# Patient Record
Sex: MALE | Race: WHITE | NOT HISPANIC OR LATINO | Employment: STUDENT | URBAN - METROPOLITAN AREA
[De-identification: names, ages, dates, MRNs, and addresses within clinical notes are randomized per-mention and may not be internally consistent; named-entity substitution may affect disease eponyms.]

---
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
## 2023-02-14 ENCOUNTER — TELEPHONE (OUTPATIENT)
Age: 18
End: 2023-02-14

## 2023-03-27 NOTE — TELEPHONE ENCOUNTER
03/27/23 2:09 PM     The office's request has been received, reviewed, and the patient chart updated  The PCP has successfully been removed with a patient attribution note  This message will now be completed      Thank you  Concepción Baron

## 2023-06-12 ENCOUNTER — OFFICE VISIT (OUTPATIENT)
Dept: OTOLARYNGOLOGY | Facility: CLINIC | Age: 18
End: 2023-06-12
Payer: COMMERCIAL

## 2023-06-12 VITALS — TEMPERATURE: 97.2 F | HEIGHT: 75 IN | BODY MASS INDEX: 39.17 KG/M2 | WEIGHT: 315 LBS

## 2023-06-12 DIAGNOSIS — H61.23 BILATERAL IMPACTED CERUMEN: Primary | ICD-10-CM

## 2023-06-12 PROCEDURE — 99203 OFFICE O/P NEW LOW 30 MIN: CPT | Performed by: NURSE PRACTITIONER

## 2023-06-12 NOTE — PROGRESS NOTES
"Assessment/Plan:    Bilateral impacted cerumen  On exam, trace cerumen bilaterally  Removed very small piece of cerumen from left ear, no procedure  Discussed Wax care at home - avoidance of q-tips, may use cerumen softeners every one to two months  Hydrocortisone cream pea sized amount on finger as needed for itching in ears  Diagnoses and all orders for this visit:    Bilateral impacted cerumen          Subjective:      Patient ID: Cinthia Verdin is a 21 y o  male  Presents today as a new patient due to ear concerns  Hearing normal   No tinnitus  No otalgia or otorrhea  History of ear surgery, prior pe tubes around age 7  No current hearing aids  Mother is patient here and concerned about cerumen blocking ears        The following portions of the patient's history were reviewed and updated as appropriate: allergies, current medications, past family history, past medical history, past social history, past surgical history and problem list     Review of Systems   Constitutional: Negative  HENT: Negative for congestion, ear discharge, ear pain, hearing loss, nosebleeds, postnasal drip, rhinorrhea, sinus pressure, sinus pain, sore throat, tinnitus and voice change  Respiratory: Negative for chest tightness and shortness of breath  Skin: Negative for color change  Neurological: Negative for dizziness, numbness and headaches  Psychiatric/Behavioral: Negative  Objective:      Temp (!) 97 2 °F (36 2 °C) (Temporal)   Ht 6' 3\" (1 905 m)   Wt (!) 164 kg (362 lb)   BMI 45 25 kg/m²          Physical Exam  Constitutional:       Appearance: He is well-developed  HENT:      Head: Normocephalic  Right Ear: Hearing, tympanic membrane, ear canal and external ear normal  No decreased hearing noted  No drainage or tenderness  Tympanic membrane is not perforated or erythematous        Left Ear: Hearing, tympanic membrane, ear canal and external ear normal  No decreased hearing " noted  No drainage or tenderness  Tympanic membrane is not perforated or erythematous  Nose: Nose normal  No nasal deformity or septal deviation  Mouth/Throat:      Mouth: Mucous membranes are not pale and not dry  No oral lesions  Dentition: Normal dentition  Pharynx: Uvula midline  No oropharyngeal exudate  Neck:      Trachea: No tracheal deviation  Pulmonary:      Effort: No accessory muscle usage or respiratory distress  Musculoskeletal:      Cervical back: Neck supple  Lymphadenopathy:      Cervical: No cervical adenopathy  Skin:     General: Skin is warm and dry  Neurological:      Mental Status: He is alert and oriented to person, place, and time  Cranial Nerves: No cranial nerve deficit  Sensory: No sensory deficit  Psychiatric:         Behavior: Behavior is cooperative

## 2023-06-12 NOTE — ASSESSMENT & PLAN NOTE
On exam, trace cerumen bilaterally  Removed very small piece of cerumen from left ear, no procedure  Discussed Wax care at home - avoidance of q-tips, may use cerumen softeners every one to two months  Hydrocortisone cream pea sized amount on finger as needed for itching in ears

## 2023-08-11 PROBLEM — H61.23 BILATERAL IMPACTED CERUMEN: Status: RESOLVED | Noted: 2023-06-12 | Resolved: 2023-08-11

## 2023-09-18 ENCOUNTER — OFFICE VISIT (OUTPATIENT)
Dept: FAMILY MEDICINE CLINIC | Facility: CLINIC | Age: 18
End: 2023-09-18
Payer: COMMERCIAL

## 2023-09-18 VITALS
HEIGHT: 75 IN | DIASTOLIC BLOOD PRESSURE: 76 MMHG | SYSTOLIC BLOOD PRESSURE: 122 MMHG | HEART RATE: 92 BPM | WEIGHT: 315 LBS | OXYGEN SATURATION: 99 % | BODY MASS INDEX: 39.17 KG/M2

## 2023-09-18 DIAGNOSIS — Z13.0 SCREENING FOR DEFICIENCY ANEMIA: ICD-10-CM

## 2023-09-18 DIAGNOSIS — Z13.29 SCREENING FOR THYROID DISORDER: ICD-10-CM

## 2023-09-18 DIAGNOSIS — E66.3 OVERWEIGHT, PEDIATRIC: ICD-10-CM

## 2023-09-18 DIAGNOSIS — R73.03 PRE-DIABETES: ICD-10-CM

## 2023-09-18 DIAGNOSIS — E78.5 DYSLIPIDEMIA: ICD-10-CM

## 2023-09-18 DIAGNOSIS — R06.83 SNORING: ICD-10-CM

## 2023-09-18 PROBLEM — L02.619 ABSCESS OF TOE: Status: RESOLVED | Noted: 2017-01-20 | Resolved: 2023-09-18

## 2023-09-18 PROCEDURE — 99204 OFFICE O/P NEW MOD 45 MIN: CPT | Performed by: INTERNAL MEDICINE

## 2023-09-18 NOTE — ASSESSMENT & PLAN NOTE
Patient with a history of snoring feels groggy in the morning but otherwise he is doing all right no witnessed apneas

## 2023-09-18 NOTE — ASSESSMENT & PLAN NOTE
Patient with a BMI of 44.37 emphasized regarding diet exercise lifestyle modification losing weight.   Patient was seen by the nutritionist in the past.

## 2023-09-18 NOTE — PROGRESS NOTES
Office Visit Note  23     Eusebia Mckinney 21 y.o. male MRN: [id number]  : 2005    Assessment:     1. Body mass index, pediatric, greater than or equal to 95th percentile for age  Assessment & Plan:  Patient with a BMI of 44.37 emphasized regarding diet exercise lifestyle modification losing weight. Patient was seen by the nutritionist in the past.      2. Overweight, pediatric    3. Snoring  Assessment & Plan:  Patient with a history of snoring feels groggy in the morning but otherwise he is doing all right no witnessed apneas      4. Screening for thyroid disorder  -     TSH, 3rd generation with Free T4 reflex; Future; Expected date: 2023  -     TSH, 3rd generation with Free T4 reflex    5. Screening for deficiency anemia  -     CBC and differential; Future  -     CBC and differential    6. Dyslipidemia  -     Lipid panel; Future  -     Lipid panel    7. Pre-diabetes  -     Comprehensive metabolic panel; Future  -     Hemoglobin A1C; Future; Expected date: 2023  -     UA w Reflex to Microscopic w Reflex to Culture; Future; Expected date: 2023  -     Comprehensive metabolic panel  -     Hemoglobin A1C      BMI Counseling: Body mass index is 44.37 kg/m². The BMI is above normal. Nutrition recommendations include decreasing portion sizes, decreasing fast food intake, consuming healthier snacks, moderation in carbohydrate intake and reducing intake of cholesterol. Exercise recommendations include moderate physical activity 150 minutes/week. No pharmacotherapy was ordered. Rationale for BMI follow-up plan is due to patient being overweight or obese. Depression Screening and Follow-up Plan: Patient was screened for depression during today's encounter. They screened negative with a PHQ-2 score of 0. Discussion Summary and Plan: Today's care plan and medications were reviewed with patient in detail and all their questions answered to their satisfaction.     No chief complaint on file. Subjective:  Patient is coming here for evaluation checkup. History of body mass index is running on the high side. History of snoring. I reviewed the records from before he had ingrown toenails in the past for which she was seen by the podiatrist.  Currently is not on any medication. Past medical history none past surgical history he has tympanoplasty and had procedure for the ingrown toenails. The following portions of the patient's history were reviewed and updated as appropriate: allergies, current medications, past family history, past medical history, past social history, past surgical history and problem list.    Review of Systems   Constitutional: Negative for chills and fever. HENT: Negative for ear pain and sore throat. Eyes: Negative for pain and visual disturbance. Respiratory: Negative for cough and shortness of breath. Cardiovascular: Negative for chest pain and palpitations. Gastrointestinal: Negative for abdominal pain and vomiting. Genitourinary: Negative for dysuria and hematuria. Musculoskeletal: Negative for arthralgias and back pain. Skin: Negative for color change and rash. Neurological: Negative for seizures and syncope. All other systems reviewed and are negative.         Historical Information   Patient Active Problem List   Diagnosis   • Overweight, pediatric   • Acute bronchitis   • Hyperpigmentation   • Well adolescent visit   • Body mass index, pediatric, greater than or equal to 95th percentile for age   • Snoring     Past Medical History:   Diagnosis Date   • Abscess of toe 1/20/2017   • Ingrowing nail 7/28/2016     Past Surgical History:   Procedure Laterality Date   • CIRCUMCISION     • TYMPANOSTOMY TUBE PLACEMENT Bilateral      Social History     Substance and Sexual Activity   Alcohol Use Not Currently     Social History     Substance and Sexual Activity   Drug Use Not Currently     Social History     Tobacco Use   Smoking Status Never   Smokeless Tobacco Never     Family History   Problem Relation Age of Onset   • Diabetes Mother    • Hypertension Father    • Anxiety disorder Father      Health Maintenance Due   Topic   • Hepatitis C Screening    • HIV Screening    • COVID-19 Vaccine (3 - Pfizer series)   • BMI: Followup Plan    • Annual Physical    • Influenza Vaccine (1)      Meds/Allergies     No current outpatient medications on file. Objective:    Vitals:   /76 (BP Location: Right arm, Patient Position: Sitting, Cuff Size: Large)   Pulse 92   Ht 6' 3" (1.905 m)   Wt (!) 161 kg (355 lb)   SpO2 99%   BMI 44.37 kg/m²   Body mass index is 44.37 kg/m². Vitals:    09/18/23 1319   Weight: (!) 161 kg (355 lb)       Physical Exam  Vitals and nursing note reviewed. Constitutional:       Appearance: Normal appearance. He is obese. Cardiovascular:      Rate and Rhythm: Normal rate and regular rhythm. Heart sounds: Normal heart sounds. Pulmonary:      Effort: Pulmonary effort is normal.      Breath sounds: Normal breath sounds. Abdominal:      Palpations: Abdomen is soft. Musculoskeletal:      Right lower leg: No edema. Left lower leg: No edema. Skin:     General: Skin is warm and dry. Neurological:      Mental Status: He is alert and oriented to person, place, and time. Lab Review   No visits with results within 2 Month(s) from this visit.    Latest known visit with results is:   Orders Only on 10/13/2022   Component Date Value Ref Range Status   • Aerobic Culture 10/13/2022 Final report (A)   Final   • Result 1 10/13/2022 Staphylococcus aureus (A)   Final    Comment: Based on susceptibility to oxacillin this isolate would be  susceptible to:  *Penicillinase-stable penicillins, such as:    Cloxacillin, Dicloxacillin, Nafcillin  *Beta-lactam combination agents, such as:    Amoxicillin-clavulanic acid, Ampicillin-sulbactam,    Piperacillin-tazobactam  *Oral cephems, such as:    Cefaclor, Cefdinir, Cefpodoxime, Cefprozil, Cefuroxime,    Cephalexin, Loracarbef  *Parenteral cephems, such as:    Cefazolin, Cefepime, Cefotaxime, Cefotetan, Ceftaroline,    Ceftizoxime, Ceftriaxone, Cefuroxime  *Carbapenems, such as:    Doripenem, Ertapenem, Imipenem, Meropenem  Most isolates of Staphylococcus sp. produce a beta-lactamase enzyme  rendering them resistant to penicillin. Please contact the laboratory  if penicillin is being considered for therapy. Moderate growth     • SL AMB ANTIMICROBIAL SUSCEPTIBILITY 10/13/2022 Comment   Final    Comment:       ** S = Susceptible; I = Intermediate; R = Resistant **                     P = Positive; N = Negative              MICS are expressed in micrograms per mL     Antibiotic                 RSLT#1    RSLT#2    RSLT#3    RSLT#4  Ciprofloxacin                  S  Clindamycin                    S  Erythromycin                   S  Gentamicin                     S  Levofloxacin                   S  Linezolid                      S  Moxifloxacin                   S  Oxacillin                      S  Quinupristin/Dalfopristin      S  Rifampin                       S  Tetracycline                   S  Trimethoprim/Sulfa             S  Vancomycin                     S           Deborah Sandoval MD        "This note has been constructed using a voice recognition system. Therefore there may be syntax, spelling, and/or grammatical errors.  Please call if you have any questions. "

## 2023-11-16 LAB
ALBUMIN SERPL-MCNC: 4.4 G/DL (ref 4.3–5.2)
ALBUMIN/GLOB SERPL: 1.8 {RATIO} (ref 1.2–2.2)
ALP SERPL-CCNC: 110 IU/L (ref 51–125)
ALT SERPL-CCNC: 24 IU/L (ref 0–44)
APPEARANCE UR: CLEAR
AST SERPL-CCNC: 17 IU/L (ref 0–40)
BACTERIA URNS QL MICRO: NORMAL
BASOPHILS # BLD AUTO: 0 X10E3/UL (ref 0–0.2)
BASOPHILS NFR BLD AUTO: 1 %
BILIRUB SERPL-MCNC: 0.4 MG/DL (ref 0–1.2)
BILIRUB UR QL STRIP: NEGATIVE
BUN SERPL-MCNC: 10 MG/DL (ref 6–20)
BUN/CREAT SERPL: 12 (ref 9–20)
CALCIUM SERPL-MCNC: 9.5 MG/DL (ref 8.7–10.2)
CASTS URNS QL MICRO: NORMAL /LPF
CHLORIDE SERPL-SCNC: 104 MMOL/L (ref 96–106)
CHOLEST SERPL-MCNC: 149 MG/DL (ref 100–199)
CHOLEST/HDLC SERPL: 4.1 RATIO (ref 0–5)
CO2 SERPL-SCNC: 22 MMOL/L (ref 20–29)
COLOR UR: YELLOW
CREAT SERPL-MCNC: 0.85 MG/DL (ref 0.76–1.27)
EGFR: 128 ML/MIN/1.73
EOSINOPHIL # BLD AUTO: 0.1 X10E3/UL (ref 0–0.4)
EOSINOPHIL NFR BLD AUTO: 2 %
EPI CELLS #/AREA URNS HPF: NORMAL /HPF (ref 0–10)
ERYTHROCYTE [DISTWIDTH] IN BLOOD BY AUTOMATED COUNT: 13.2 % (ref 11.6–15.4)
EST. AVERAGE GLUCOSE BLD GHB EST-MCNC: 114 MG/DL
GLOBULIN SER-MCNC: 2.5 G/DL (ref 1.5–4.5)
GLUCOSE SERPL-MCNC: 98 MG/DL (ref 70–99)
GLUCOSE UR QL: NEGATIVE
HBA1C MFR BLD: 5.6 % (ref 4.8–5.6)
HCT VFR BLD AUTO: 45.6 % (ref 37.5–51)
HDLC SERPL-MCNC: 36 MG/DL
HGB BLD-MCNC: 15.1 G/DL (ref 13–17.7)
HGB UR QL STRIP: NEGATIVE
IMM GRANULOCYTES # BLD: 0 X10E3/UL (ref 0–0.1)
IMM GRANULOCYTES NFR BLD: 0 %
KETONES UR QL STRIP: NEGATIVE
LDLC SERPL CALC-MCNC: 90 MG/DL (ref 0–99)
LEUKOCYTE ESTERASE UR QL STRIP: NEGATIVE
LYMPHOCYTES # BLD AUTO: 1.9 X10E3/UL (ref 0.7–3.1)
LYMPHOCYTES NFR BLD AUTO: 30 %
MCH RBC QN AUTO: 26.4 PG (ref 26.6–33)
MCHC RBC AUTO-ENTMCNC: 33.1 G/DL (ref 31.5–35.7)
MCV RBC AUTO: 80 FL (ref 79–97)
MICRO URNS: NORMAL
MICRO URNS: NORMAL
MONOCYTES # BLD AUTO: 0.4 X10E3/UL (ref 0.1–0.9)
MONOCYTES NFR BLD AUTO: 7 %
NEUTROPHILS # BLD AUTO: 3.9 X10E3/UL (ref 1.4–7)
NEUTROPHILS NFR BLD AUTO: 60 %
NITRITE UR QL STRIP: NEGATIVE
PH UR STRIP: 6 [PH] (ref 5–7.5)
PLATELET # BLD AUTO: 290 X10E3/UL (ref 150–450)
POTASSIUM SERPL-SCNC: 4.2 MMOL/L (ref 3.5–5.2)
PROT SERPL-MCNC: 6.9 G/DL (ref 6–8.5)
PROT UR QL STRIP: NEGATIVE
RBC # BLD AUTO: 5.72 X10E6/UL (ref 4.14–5.8)
RBC #/AREA URNS HPF: NORMAL /HPF (ref 0–2)
SL AMB URINALYSIS REFLEX: NORMAL
SL AMB VLDL CHOLESTEROL CALC: 23 MG/DL (ref 5–40)
SODIUM SERPL-SCNC: 140 MMOL/L (ref 134–144)
SP GR UR: 1.03 (ref 1–1.03)
TRIGL SERPL-MCNC: 131 MG/DL (ref 0–149)
TSH SERPL DL<=0.005 MIU/L-ACNC: 2.93 UIU/ML (ref 0.45–4.5)
UROBILINOGEN UR STRIP-ACNC: 0.2 MG/DL (ref 0.2–1)
WBC # BLD AUTO: 6.5 X10E3/UL (ref 3.4–10.8)
WBC #/AREA URNS HPF: NORMAL /HPF (ref 0–5)

## 2024-02-02 ENCOUNTER — OFFICE VISIT (OUTPATIENT)
Dept: FAMILY MEDICINE CLINIC | Facility: CLINIC | Age: 19
End: 2024-02-02
Payer: COMMERCIAL

## 2024-02-02 VITALS
SYSTOLIC BLOOD PRESSURE: 118 MMHG | WEIGHT: 315 LBS | BODY MASS INDEX: 39.17 KG/M2 | OXYGEN SATURATION: 98 % | HEIGHT: 75 IN | DIASTOLIC BLOOD PRESSURE: 70 MMHG | HEART RATE: 98 BPM

## 2024-02-02 DIAGNOSIS — E66.01 CLASS 3 SEVERE OBESITY DUE TO EXCESS CALORIES WITHOUT SERIOUS COMORBIDITY WITH BODY MASS INDEX (BMI) OF 40.0 TO 44.9 IN ADULT (HCC): Primary | ICD-10-CM

## 2024-02-02 DIAGNOSIS — G47.39 OTHER SLEEP APNEA: ICD-10-CM

## 2024-02-02 PROBLEM — L81.9 HYPERPIGMENTATION: Status: RESOLVED | Noted: 2022-02-28 | Resolved: 2024-02-02

## 2024-02-02 PROBLEM — E66.813 CLASS 3 SEVERE OBESITY DUE TO EXCESS CALORIES WITHOUT SERIOUS COMORBIDITY WITH BODY MASS INDEX (BMI) OF 40.0 TO 44.9 IN ADULT (HCC): Status: ACTIVE | Noted: 2024-02-02

## 2024-02-02 PROCEDURE — 99214 OFFICE O/P EST MOD 30 MIN: CPT | Performed by: INTERNAL MEDICINE

## 2024-02-02 NOTE — PROGRESS NOTES
Office Visit Note  24     Toribio Mack 18 y.o. male MRN: [id number]  : 2005    Assessment:     1. Class 3 severe obesity due to excess calories without serious comorbidity with body mass index (BMI) of 40.0 to 44.9 in adult ([facility])  Assessment & Plan:  Patient BMI is at 44.50 emphasized regarding diet exercise lifestyle modification lose weight will also refer the patient to the weight management clinic his A1c is at 5.6 with a family history of diabetes.    Orders:  -     Ambulatory referral to Weight Management; Future    2. Other sleep apnea  Assessment & Plan:  Patient at times feels groggy when he wakes up in the morning he is not sure nobody has told him he is no falls but considering his weight and the neck size possibility of sleep apnea is there will recommend evaluation for the same and follow-up.    Orders:  -     Ambulatory referral to Pulmonology; Future               Discussion Summary and Plan:  Today's care plan and medications were reviewed with patient in detail and all their questions answered to their satisfaction.    Chief Complaint   Patient presents with    Follow-up      Subjective:  Patient is coming here for a follow-up evaluation with regards to the symptoms of his weight and also possibility of sleep apnea.  Patient did lose some weight he was on a strict diet including water fast diet but he got sick.  But he gained the weight back.  Medications none labs reviewed hemoglobin A1c is at 5.6 family history of diabetes with mother.        The following portions of the patient's history were reviewed and updated as appropriate: allergies, current medications, past family history, past medical history, past social history, past surgical history and problem list.    Review of Systems   Constitutional:  Negative for chills and fever.   HENT:  Negative for ear pain and sore throat.    Eyes:  Negative for pain and visual disturbance.   Respiratory:  Negative for cough and  "shortness of breath.    Cardiovascular:  Negative for chest pain and palpitations.   Gastrointestinal:  Negative for abdominal pain and vomiting.   Genitourinary:  Negative for dysuria and hematuria.   Musculoskeletal:  Negative for arthralgias and back pain.   Skin:  Negative for color change and rash.   Neurological:  Negative for seizures and syncope.   All other systems reviewed and are negative.        Historical Information   Patient Active Problem List   Diagnosis    Class 3 severe obesity due to excess calories without serious comorbidity with body mass index (BMI) of 40.0 to 44.9 in adult ([facility])    Other sleep apnea     Past Medical History:   Diagnosis Date    Abscess of toe 1/20/2017    Ingrowing nail 7/28/2016     Past Surgical History:   Procedure Laterality Date    CIRCUMCISION      TYMPANOSTOMY TUBE PLACEMENT Bilateral      Social History     Substance and Sexual Activity   Alcohol Use Not Currently     Social History     Substance and Sexual Activity   Drug Use Not Currently     Social History     Tobacco Use   Smoking Status Never   Smokeless Tobacco Never     Family History   Problem Relation Age of Onset    Diabetes Mother     Hypertension Father     Anxiety disorder Father      Health Maintenance Due   Topic    Hepatitis C Screening     HIV Screening     Annual Physical     Influenza Vaccine (1)    COVID-19 Vaccine (3 - 2023-24 season)      Meds/Allergies     No current outpatient medications on file.      Objective:    Vitals:   /70 (BP Location: Right arm, Patient Position: Sitting, Cuff Size: Large)   Pulse 98   Ht 6' 3\" (1.905 m)   Wt (!) 161 kg (356 lb)   SpO2 98%   BMI 44.50 kg/m²   Body mass index is 44.5 kg/m².  Vitals:    02/02/24 0932   Weight: (!) 161 kg (356 lb)       Physical Exam  Vitals and nursing note reviewed.   Constitutional:       Appearance: He is obese.   Cardiovascular:      Rate and Rhythm: Normal rate and regular rhythm.      Heart sounds: Normal heart sounds. " "  Pulmonary:      Effort: Pulmonary effort is normal.      Breath sounds: Normal breath sounds.   Abdominal:      Palpations: Abdomen is soft.   Musculoskeletal:         General: Normal range of motion.      Right lower leg: No edema.      Left lower leg: No edema.   Skin:     General: Skin is warm and dry.   Neurological:      Mental Status: He is alert and oriented to person, place, and time.   Psychiatric:         Mood and Affect: Mood normal.         Behavior: Behavior normal.         Lab Review   No visits with results within 2 Month(s) from this visit.   Latest known visit with results is:   Orders Only on 11/15/2023   Component Date Value Ref Range Status    Specific Gravity 11/15/2023 1.026  1.005 - 1.030 Final    Ph 11/15/2023 6.0  5.0 - 7.5 Final    Color UA 11/15/2023 Yellow  Yellow Final    Urine Appearance 11/15/2023 Clear  Clear Final    Leukocyte Esterase 11/15/2023 Negative  Negative Final    Protein 11/15/2023 Negative  Negative/Trace Final    Glucose, 24 HR Urine 11/15/2023 Negative  Negative Final    Ketone, Urine 11/15/2023 Negative  Negative Final    Blood, Urine 11/15/2023 Negative  Negative Final    Bilirubin, Urine 11/15/2023 Negative  Negative Final    Urobilinogen Urine 11/15/2023 0.2  0.2 - 1.0 mg/dL Final    Nitrites Urine 11/15/2023 Negative  Negative Final    Microscopic Examination 11/15/2023 Comment   Final    Microscopic follows if indicated.    Microscopic Examination 11/15/2023 See below:   Final    Microscopic was indicated and was performed.    Urinalysis Reflex 11/15/2023 Comment   Final    This specimen will not reflex to a Urine Culture.    SL AMB WBC, URINE 11/15/2023 None seen  0 - 5 /hpf Final    RBC, Urine 11/15/2023 None seen  0 - 2 /hpf Final    Epithelial Cells (non renal) 11/15/2023 None seen  0 - 10 /hpf Final    Casts 11/15/2023 None seen  None seen /lpf Final    Bacteria, Urine 11/15/2023 None seen  None seen/Few Final         Mike Pate MD        \"This note " "has been constructed using a voice recognition system.Therefore there may be syntax, spelling, and/or grammatical errors. Please call if you have any questions. \"  "

## 2024-02-02 NOTE — ASSESSMENT & PLAN NOTE
Patient at times feels groggy when he wakes up in the morning he is not sure nobody has told him he is no falls but considering his weight and the neck size possibility of sleep apnea is there will recommend evaluation for the same and follow-up.

## 2024-02-02 NOTE — ASSESSMENT & PLAN NOTE
Patient BMI is at 44.50 emphasized regarding diet exercise lifestyle modification lose weight will also refer the patient to the weight management clinic his A1c is at 5.6 with a family history of diabetes.

## 2024-02-28 ENCOUNTER — OFFICE VISIT (OUTPATIENT)
Dept: BARIATRICS | Facility: CLINIC | Age: 19
End: 2024-02-28
Payer: COMMERCIAL

## 2024-02-28 VITALS
BODY MASS INDEX: 38.36 KG/M2 | SYSTOLIC BLOOD PRESSURE: 132 MMHG | HEIGHT: 76 IN | HEART RATE: 107 BPM | WEIGHT: 315 LBS | DIASTOLIC BLOOD PRESSURE: 86 MMHG

## 2024-02-28 DIAGNOSIS — E66.01 CLASS 3 SEVERE OBESITY DUE TO EXCESS CALORIES WITHOUT SERIOUS COMORBIDITY WITH BODY MASS INDEX (BMI) OF 40.0 TO 44.9 IN ADULT (HCC): ICD-10-CM

## 2024-02-28 PROCEDURE — 99204 OFFICE O/P NEW MOD 45 MIN: CPT | Performed by: INTERNAL MEDICINE

## 2024-02-28 NOTE — PROGRESS NOTES
Assessment/Plan     Toribio Mack is 18 y.o. year old male  who comes in for consultation for assistance with weight management.     - Discussed options of HealthyCORE-Intensive Lifestyle Intervention Program, Very Low Calorie Diet-VLCD, and Conservative Program and the role of weight loss medications.  - Patient is interested in pursuing Conservative Program    Class 3 severe obesity due to excess calories without serious comorbidity with body mass index (BMI) of 40.0 to 44.9 in adult ([facility])  Discussed with patient diet recall.  Recommended making healthy choices for meals and snacks being out which  -Recommended not skipping meals and incorporating protein in each meal with meals and snacks.  List of protein shakes snacks and lean protein sources provided  -Calorie target provided as below; recommended patient start tracking using my fitness pal-flyer provided  Patient currently reports he does not have access to a gym; he can walk around his neighborhood-so recommended walking at a moderate intensity 150-250 minutes per week  -Recommended resistance band and YouTube exercises 2 to 3 days a week  -Patient currently sleeps at 12 AM try to move bedtime back by 30 minutes each week to get adequate sleep 7 to 8 hours  -Presented the option of medications to the patient for weight loss.  Patient currently not interested in discussing this.  -He will see me back in follow-up in 3 months.  Based on his success and adherence to lifestyle changes, we can revisit the discussion at that time    Toribio was seen today for consult.    Diagnoses and all orders for this visit:    Class 3 severe obesity due to excess calories without serious comorbidity with body mass index (BMI) of 40.0 to 44.9 in adult (HCC)  -     Ambulatory referral to Weight Management             Calorie goal: 5455-3403 calories/day;  -Please note that this is a rough estimate and the calorie target would be adjusted and the protein target would  "be provided by the dietitian.    -In addition, please follow general recommendations below.          Return visit:  3 months         General Lifestyle recommendations:    Nutrition   -Avoid skipping meals. Avoid sugary beverages. At least 64oz of water daily.  Limit processed food, refined sugars and grain. Encourage  healthy choices for meals and snacks   -Focus on protein goals and non starchy fiber rich vegetables for satiety effect and to help support a calorie deficit.   - Emphasize portion control, well balanced macronutrient's (protein, carbohydrate, fat using MyPlate method )and adequate protein with each meal/snacks and distributing calories equally throughout the day along with.   -Advise starting the day with a protein breakfast   Behavioral/Stress   Food log via selam or provided paper log (selam options include www.Thanx.com, sparkpeople.com, loseit.com, calorieking.com, Preact). Encouraged mindful eating. Be sure to set aside time to eat, eat slowly, and savor your food. Consider meditation apps and/or taking a few minutes of mindfulness every AM. Understand the role of regarding the role of stress hormone cortisol in promoting weight gain and visceral fat accumulation. Weigh daily or atleast 2-3 times/ week  Physical Activity   Increase physical activity by 10 minutes daily. Gradually increase physical activity to a goal of 5 days per week for 30 minutes of MODERATE intensity ( should be able to pass the \"talk test\" but should not be able to sing. Target 150-300 minutes  PLUS 2 days per week of FULL BODY resistance training. Progression will be addressed at follow up visits. Encouraged contemplation regarding establishing a daily physical activity routine  - Resistance training along with increase protein intake is important to maintain and enhance metabolism  Sleep   Encourage sleep hygiene and importance of having adequate sleep duration at least > 6 hours to support response in weight loss " efforts    Handouts provided :  THRIVE program at [facility]  MyPlate and food quality  Food log resources, phone selam or paper journal  Antiobesity medications options     - Discussed at length and the role of weight loss medications and medication options   - Explained the importance of making lifestyle changes in addition to starting any anti-obesity medications if the  patient chooses.  -  Initial weight loss goal of 5-10% weight loss for improved health  - Weight loss can improve patient's co-morbid conditions and/or prevent weight-related complications.  - Weight is not at goal and patient has been unable to achieve a meaningful weight loss above 5% using various programs and tools for more than 6 months    Toribio was seen today for consult.    Diagnoses and all orders for this visit:    Class 3 severe obesity due to excess calories without serious comorbidity with body mass index (BMI) of 40.0 to 44.9 in adult (HCC)  -     Ambulatory referral to Weight Management                      Total time spent reviewing chart, interviewing patient, examining patient, discussing plan, answering all questions, and documentin min, with >50% face-to-face time spent counseling patient on nonsurgical interventions for the treatment of excess weight. Discussed in detail nonsurgical options including intensive lifestyle intervention program, very low-calorie diet program and conservative program.  Discussed the role of weight loss medications.  Counseled patient on diet behavior and exercise modification for weight loss.            Lifestyle questionnaire       Diet recall:  B: skips  L: fast food  D: mom cooks italian   S: none  Eating out vs cooking at home- 2x/ week    Beverages  Water--   one bottle ( 16 oz)  Caffeine/tea--    none  SSB 16 oz diet or regular pepsi    Alcohol: none  Smoking: none  Drug use: none    Physical Activity -- walk around the neighborhood,   Sleep -- STOP- BANG- 8; 12 AM- 8  "AM    Occupation- looking for a job  Psycho social- live with parents        Weight history     Start date: 02/28/24  Current weight : 351 lbs  Current BMI: 42.94 kg/m2  Obesity Class: Above 40- Obesity Class III  Goal weight: < 300  lbs    Onset-- big baby, in July 364 lbs  Food behaviors\"head\" hunger/cravings- ground beef and chicken cravings  Gyneac (Menopausal status/periods/contraception)-  Fam hx of obesity- both   Weight loss medications tried: none    Patient reports and other history-  Referred by PCP  - Feels like he is able to drop weight with lifestyle changes   - Doesn't want medications    Wt Readings from Last 20 Encounters:   02/28/24 (!) 160 kg (351 lb 12.8 oz)   02/02/24 (!) 161 kg (356 lb)   09/18/23 (!) 161 kg (355 lb) (>99%, Z= 3.59)*   06/12/23 (!) 164 kg (362 lb) (>99%, Z= 3.64)*   11/16/22 (!) 161 kg (356 lb) (>99%, Z= 3.54)*   11/02/22 (!) 161 kg (356 lb) (>99%, Z= 3.54)*   10/13/22 (!) 161 kg (356 lb) (>99%, Z= 3.53)*   09/08/22 (!) 161 kg (356 lb) (>99%, Z= 3.52)*   08/11/22 (!) 161 kg (356 lb) (>99%, Z= 3.52)*   07/21/22 (!) 161 kg (356 lb) (>99%, Z= 3.51)*   07/07/22 (!) 161 kg (356 lb) (>99%, Z= 3.51)*   11/24/21 (!) 161 kg (356 lb) (>99%, Z= 3.51)*   06/24/20 (!) 145 kg (319 lb 6.4 oz) (>99%, Z= 3.46)*   03/10/20 (!) 140 kg (309 lb) (>99%, Z= 3.44)*   02/26/20 (!) 142 kg (312 lb) (>99%, Z= 3.47)*   02/20/20 (!) 143 kg (316 lb) (>99%, Z= 3.51)*   02/19/20 (!) 142 kg (313 lb) (>99%, Z= 3.49)*   01/08/20 (!) 143 kg (315 lb) (>99%, Z= 3.53)*   11/20/19 (!) 147 kg (323 lb) (>99%, Z= 3.64)*   11/30/18 133 kg (294 lb) (>99%, Z= 3.59)*     * Growth percentiles are based on [facility] (Boys, 2-20 Years) data.           Medication considerations/contraindications     -Patient denies personal history of pancreatitis. Patient also denies personal and family history of medullary thyroid cancer and multiple endocrine neoplasia type 2 (MEN 2 tumor). -Patient denies any history of kidney stones, seizures, or " "glaucoma, diabetic retinopathy, gall bladder disease, hyperthyroidism.  -Denies Hx of CAD, PAD, palpitations, arrhythmia.   -Denies uncontrolled anxiety or depression, suicidal behavior or thinking , insomnia or sleep disturbance.         Past medical history/past surgical history       Previous notes and records have been reviewed.    The following portions of the patient's history were reviewed and updated as appropriate: allergies, current medications, past family history, past medical history, past social history, past surgical history, and problem list.    Past Medical History:   Diagnosis Date    Abscess of toe 1/20/2017    Ingrowing nail 7/28/2016         Past Surgical History:   Procedure Laterality Date    CIRCUMCISION      TYMPANOSTOMY TUBE PLACEMENT Bilateral              Family History   Problem Relation Age of Onset    Diabetes Mother     Hypertension Father     Anxiety disorder Father             Objective     /86 (BP Location: Left arm, Patient Position: Sitting, Cuff Size: Large)   Pulse (!) 107   Ht 6' 3.9\" (1.928 m)   Wt (!) 160 kg (351 lb 12.8 oz)   BMI 42.94 kg/m²       Review of Systems   Constitutional:  Negative for fatigue.   HENT:  Negative for sore throat.    Respiratory:  Negative for cough and shortness of breath.    Cardiovascular:  Negative for chest pain, palpitations and leg swelling.   Gastrointestinal:  Negative for abdominal pain, constipation, diarrhea and nausea.   Genitourinary:  Negative for dysuria.   Musculoskeletal:  Negative for arthralgias and back pain.   Skin:  Negative for rash.   Neurological:  Negative for headaches.   Psychiatric/Behavioral:  Negative for dysphoric mood. The patient is not nervous/anxious.        Physical Exam  Vitals and nursing note reviewed.   Constitutional:       Appearance: Normal appearance.   HENT:      Head: Normocephalic.   Pulmonary:      Effort: Pulmonary effort is normal.   Neurological:      General: No focal deficit present. "      Mental Status: He is alert and oriented to person, place, and time.   Psychiatric:         Mood and Affect: Mood normal.         Behavior: Behavior normal.         Thought Content: Thought content normal.         Judgment: Judgment normal.            Medications     No current outpatient medications on file.           Labs and imaging     Recent labs and imaging have been personally reviewed.  Lab Results   Component Value Date    WBC 6.5 11/15/2023    HGB 15.1 11/15/2023    HCT 45.6 11/15/2023    MCV 80 11/15/2023     11/15/2023     Lab Results   Component Value Date    SODIUM 140 11/15/2023    K 4.2 11/15/2023     11/15/2023    CO2 22 11/15/2023    BUN 10 11/15/2023    CREATININE 0.85 11/15/2023    GLUC 98 11/15/2023    AST 17 11/15/2023    ALT 24 11/15/2023    TP 6.9 11/15/2023    TBILI 0.4 11/15/2023    EGFR 128 11/15/2023     Lab Results   Component Value Date    HGBA1C 5.6 11/15/2023     Lab Results   Component Value Date    TSH 2.930 11/15/2023     Lab Results   Component Value Date    CHOLESTEROL 149 11/15/2023     Lab Results   Component Value Date    HDL 36 (L) 11/15/2023     Lab Results   Component Value Date    TRIG 131 11/15/2023     Lab Results   Component Value Date    LDLCALC 90 11/15/2023

## 2024-02-28 NOTE — ASSESSMENT & PLAN NOTE
Discussed with patient diet recall.  Recommended making healthy choices for meals and snacks being out which  -Recommended not skipping meals and incorporating protein in each meal with meals and snacks.  List of protein shakes snacks and lean protein sources provided  -Calorie target provided as below; recommended patient start tracking using my fitness pal-flyer provided  Patient currently reports he does not have access to a gym; he can walk around his neighborhood-so recommended walking at a moderate intensity 150-250 minutes per week  -Recommended resistance band and YouTube exercises 2 to 3 days a week  -Patient currently sleeps at 12 AM try to move bedtime back by 30 minutes each week to get adequate sleep 7 to 8 hours  -Presented the option of medications to the patient for weight loss.  Patient currently not interested in discussing this.  -He will see me back in follow-up in 3 months.  Based on his success and adherence to lifestyle changes, we can revisit the discussion at that time

## 2024-03-04 ENCOUNTER — CONSULT (OUTPATIENT)
Dept: PULMONOLOGY | Facility: MEDICAL CENTER | Age: 19
End: 2024-03-04
Payer: COMMERCIAL

## 2024-03-04 VITALS
RESPIRATION RATE: 12 BRPM | DIASTOLIC BLOOD PRESSURE: 62 MMHG | BODY MASS INDEX: 38.36 KG/M2 | SYSTOLIC BLOOD PRESSURE: 128 MMHG | HEIGHT: 76 IN | OXYGEN SATURATION: 98 % | WEIGHT: 315 LBS | HEART RATE: 100 BPM | TEMPERATURE: 98 F

## 2024-03-04 DIAGNOSIS — R06.83 SNORING: ICD-10-CM

## 2024-03-04 DIAGNOSIS — E66.01 CLASS 3 SEVERE OBESITY DUE TO EXCESS CALORIES WITHOUT SERIOUS COMORBIDITY WITH BODY MASS INDEX (BMI) OF 40.0 TO 44.9 IN ADULT (HCC): ICD-10-CM

## 2024-03-04 DIAGNOSIS — G47.39 OTHER SLEEP APNEA: Primary | ICD-10-CM

## 2024-03-04 PROCEDURE — 99243 OFF/OP CNSLTJ NEW/EST LOW 30: CPT | Performed by: INTERNAL MEDICINE

## 2024-03-04 NOTE — PROGRESS NOTES
"Assessment/Plan:       Problem List Items Addressed This Visit          Respiratory    Other sleep apnea - Primary     Although is not complain of excessive daytime somnolence he is significant overweight and Mallampati score is 3.  He does snore.  He does have obstructive sleep apnea.  I did discuss diagnosis and treatment of JASON with him.  I will order an in lab diagnostic sleep study.  He was agreeable to this.            Other    Class 3 severe obesity due to excess calories without serious comorbidity with body mass index (BMI) of 40.0 to 44.9 in adult (HCC)     Toribio is obese and may have obstructive sleep apnea.  He is trying to lose weight and recently saw dietitian.  I did encourage him in his efforts at continued weight loss         Relevant Orders    Ambulatory Referral to Sleep Medicine    Snoring     He does have loud snoring.  No morning headache.  Neck size is 18.25 inches.  Mallampati score is 3         Relevant Orders    Ambulatory Referral to Sleep Medicine         Plan for follow up:    Patient will have in lab diagnostic sleep study and will contact her office after this is completed for further recommendations.    All questions are answered to the patient's satisfaction and understanding.  He verbalizes understanding.  He is encouraged to call with any further questions or concerns.    Portions of the record may have been created with voice recognition software.  Occasional wrong word or \"sound a like\" substitutions may have occurred due to the inherent limitations of voice recognition software.  Read the chart carefully and recognize, using context, where substitutions have occurred.a    Electronically Signed by Teto Healy DO    ______________________________________________________________________    Chief Complaint: here for evaluation of possible JASON       Patient ID: Toribio is a 18 y.o. y.o. male has a past medical history of Abscess of toe (1/20/2017) and Ingrowing nail " (7/28/2016).    3/4/2024  Patient presents today for initial visit for possible JASON    HPI    Toribio is 18 years old and presents for evaluation of sleep disordered breathing.  He does have snoring.  He is overweight.  States he has been told that he does snore now.  Does not wake up gasping for air.  If he sleeps 7 to 8 hours he feels like he is not tired during the day.  Not having any morning headaches.  Does not wake up gasping for air.  Does not have any shortness of breath with activity.  He did see dietitian recently and is trying to lose weight. He is a nonsmoker.    He did not have any history of asthma, hypertension, heart disease or kidney disease    Serum hemoglobin A1c was 5.6 on 11/15/2023.  TSH was normal.    Occupational/Exposure history: unemployed at present  :        Review of Systems   Constitutional:  Negative for chills, fever and unexpected weight change.   HENT:  Negative for congestion, rhinorrhea and sore throat.    Eyes:  Negative for discharge and redness.   Respiratory:  Negative for cough and shortness of breath.    Cardiovascular:  Negative for chest pain, palpitations and leg swelling.   Gastrointestinal:  Negative for abdominal distention, abdominal pain and nausea.   Endocrine: Negative for polydipsia and polyphagia.   Genitourinary:  Negative for dysuria.   Musculoskeletal:  Negative for joint swelling and myalgias.   Skin:  Negative for rash.   Neurological:  Negative for light-headedness.   Psychiatric/Behavioral:  Negative for decreased concentration.        Social history: He reports that he has never smoked. He has never used smokeless tobacco. He reports that he does not currently use alcohol. He reports that he does not currently use drugs.    Past surgical history:   Past Surgical History:   Procedure Laterality Date    CIRCUMCISION      TYMPANOSTOMY TUBE PLACEMENT Bilateral      Family history:   Family History   Problem Relation Age of Onset    Diabetes Mother      "Hypertension Father     Anxiety disorder Father        Immunization History   Administered Date(s) Administered    COVID-19 PFIZER VACCINE 0.3 ML IM 03/23/2021, 04/15/2021    DTaP / HiB 03/02/2005    DTaP 5 01/14/2004, 03/18/2004, 05/27/2004, 04/07/2008    HPV Quadrivalent 12/23/2015    HPV9 03/07/2016, 07/07/2016    Hep A, ped/adol, 2 dose 03/20/2012, 03/10/2020    Hep B, Adolescent or Pediatric 2003, 2003, 09/10/2004    Hib (PRP-T) 01/14/2004, 03/18/2004, 05/27/2004    INFLUENZA 09/27/2012    IPV 01/14/2004, 03/18/2004, 05/27/2004, 04/07/2008    Influenza Quadrivalent Preservative Free 3 years and older IM 10/13/2015, 09/27/2016, 09/06/2017    Influenza, injectable, quadrivalent, preservative free 0.5 mL 09/26/2018, 09/25/2019, 10/14/2020, 10/06/2021, 10/01/2022    Influenza, seasonal, injectable 09/30/2005    Influenza, seasonal, injectable, preservative free 10/27/2004, 11/29/2004, 12/18/2006, 11/13/2007, 10/12/2009, 11/20/2010, 10/17/2011, 11/10/2014    MMR 11/29/2004, 04/07/2008    Meningococcal B, OMV (BEXSERO) 02/28/2022    Meningococcal MCV4P 03/10/2020    Meningococcal, Unknown Serogroups 12/22/2014    Pneumococcal Conjugate PCV 7 01/14/2004, 03/18/2004, 09/20/2004, 05/03/2005    Tdap 12/24/2014    Varicella 11/29/2004, 03/20/2012     No current outpatient medications on file.     No current facility-administered medications for this visit.     Allergies: Patient has no known allergies.    Objective:  Vitals:    03/04/24 0841   BP: 128/62   BP Location: Left arm   Patient Position: Sitting   Cuff Size: Standard   Pulse: 100   Resp: 12   Temp: 98 °F (36.7 °C)   TempSrc: Temporal   SpO2: 98%   Weight: (!) 159 kg (350 lb 9.6 oz)   Height: 6' 4\" (1.93 m)   Oxygen Therapy  SpO2: 98 %  .  Wt Readings from Last 3 Encounters:   03/04/24 (!) 159 kg (350 lb 9.6 oz)   02/28/24 (!) 160 kg (351 lb 12.8 oz)   02/02/24 (!) 161 kg (356 lb)     Body mass index is 42.68 kg/m².    Physical Exam  Vitals reviewed. "   Constitutional:       General: He is not in acute distress.     Appearance: Normal appearance. He is well-developed. He is obese. He is not diaphoretic.   HENT:      Head: Normocephalic and atraumatic.      Right Ear: External ear normal.      Left Ear: External ear normal.      Nose: Nose normal.      Mouth/Throat:      Mouth: Mucous membranes are moist.      Pharynx: Oropharynx is clear. No oropharyngeal exudate.      Comments: Mallampati score is 3  Eyes:      Pupils: Pupils are equal, round, and reactive to light.   Neck:      Thyroid: No thyromegaly.      Trachea: No tracheal deviation.      Comments: Neck circumference 18.25 inches  Cardiovascular:      Rate and Rhythm: Normal rate and regular rhythm.      Heart sounds: No murmur heard.  Pulmonary:      Effort: Pulmonary effort is normal. No respiratory distress.      Breath sounds: Normal breath sounds. No wheezing or rales.   Chest:      Chest wall: No tenderness.   Abdominal:      General: Bowel sounds are normal. There is no distension.      Palpations: Abdomen is soft.      Tenderness: There is no abdominal tenderness.   Musculoskeletal:         General: Normal range of motion.      Cervical back: Normal range of motion and neck supple.      Comments: No edema, cyanosis or clubbing   Lymphadenopathy:      Cervical: No cervical adenopathy.   Skin:     General: Skin is warm and dry.   Neurological:      General: No focal deficit present.      Mental Status: He is alert and oriented to person, place, and time.      Cranial Nerves: No cranial nerve deficit.   Psychiatric:         Mood and Affect: Mood normal.         Behavior: Behavior normal.         Thought Content: Thought content normal.         Lab Review:   Orders Only on 11/15/2023   Component Date Value    Specific Gravity 11/15/2023 1.026     Ph 11/15/2023 6.0     Color UA 11/15/2023 Yellow     Urine Appearance 11/15/2023 Clear     Leukocyte Esterase 11/15/2023 Negative     Protein 11/15/2023  Negative     Glucose, 24 HR Urine 11/15/2023 Negative     Ketone, Urine 11/15/2023 Negative     Blood, Urine 11/15/2023 Negative     Bilirubin, Urine 11/15/2023 Negative     Urobilinogen Urine 11/15/2023 0.2     Nitrites Urine 11/15/2023 Negative     Microscopic Examination 11/15/2023 Comment     Microscopic Examination 11/15/2023 See below:     Urinalysis Reflex 11/15/2023 Comment     SL AMB WBC, URINE 11/15/2023 None seen     RBC, Urine 11/15/2023 None seen     Epithelial Cells (non re* 11/15/2023 None seen     Casts 11/15/2023 None seen     Bacteria, Urine 11/15/2023 None seen    Office Visit on 09/18/2023   Component Date Value    White Blood Cell Count 11/15/2023 6.5     Red Blood Cell Count 11/15/2023 5.72     Hemoglobin 11/15/2023 15.1     HCT 11/15/2023 45.6     MCV 11/15/2023 80     MCH 11/15/2023 26.4 (L)     MCHC 11/15/2023 33.1     RDW 11/15/2023 13.2     Platelet Count 11/15/2023 290     Neutrophils 11/15/2023 60     Lymphocytes 11/15/2023 30     Monocytes 11/15/2023 7     Eosinophils 11/15/2023 2     Basophils PCT 11/15/2023 1     Neutrophils (Absolute) 11/15/2023 3.9     Lymphocytes (Absolute) 11/15/2023 1.9     Monocytes (Absolute) 11/15/2023 0.4     Eosinophils (Absolute) 11/15/2023 0.1     Basophils ABS 11/15/2023 0.0     Immature Granulocytes 11/15/2023 0     Immature Granulocytes (A* 11/15/2023 0.0     Glucose, Random 11/15/2023 98     BUN 11/15/2023 10     Creatinine 11/15/2023 0.85     eGFR 11/15/2023 128     SL AMB BUN/CREATININE RA* 11/15/2023 12     Sodium 11/15/2023 140     Potassium 11/15/2023 4.2     Chloride 11/15/2023 104     CO2 11/15/2023 22     CALCIUM 11/15/2023 9.5     Protein, Total 11/15/2023 6.9     Albumin 11/15/2023 4.4     Globulin, Total 11/15/2023 2.5     Albumin/Globulin Ratio 11/15/2023 1.8     TOTAL BILIRUBIN 11/15/2023 0.4     Alk Phos Isoenzymes 11/15/2023 110     AST 11/15/2023 17     ALT 11/15/2023 24     Hemoglobin A1C 11/15/2023 5.6     Estimated Average Glucose  11/15/2023 114     Cholesterol, Total 11/15/2023 149     Triglycerides 11/15/2023 131     HDL 11/15/2023 36 (L)     VLDL Cholesterol Calcula* 11/15/2023 23     LDL Calculated 11/15/2023 90     T. Chol/HDL Ratio 11/15/2023 4.1     TSH 11/15/2023 2.930           ESS: Total score: 0. Neck size 18.25 inches

## 2024-03-04 NOTE — PATIENT INSTRUCTIONS
I placed order for an in lab sleep study    Will contact you after this is read and review results and recommendations

## 2024-03-04 NOTE — ASSESSMENT & PLAN NOTE
He does have loud snoring.  No morning headache.  Neck size is 18.25 inches.  Mallampati score is 3   Reza Underwood is here for IV hydration due to dehydration, weakness, nausea and hypokalemia    Reviewed and verified Advanced Directives: Yes: Advance Directive is in chart     Is the patient on an active anti-cancer treatment plan?   Yes,   Regimen: Folfox  Cycle/Day: Last Tx on 8/28    Oral Chemotherapy: No    ECOG [09/18/23 1021]   ECOG Performance Status 0        Nursing Assessment:   A focused nursing assessment addressing the toxicity of chemotherapy was performed and the patient reports the following:    Toxicity Assessment    Auditory/Ear  Assessment: Yes (Within Defined Limits)    Cardiac General  Assessment: Yes (Within Defined Limits)    Constitutional  Assessment: Yes (w/ Exceptions to WDL)  Fatigue: Grade 2  Insomnia: Grade 1    Dermatology/Skin  Assessment: Yes (Within Defined Limits)    Endocrine  Assessment: Yes (Within Defined Limits)    Gastrointestinal  Assessment: Yes (w/ Exceptions to WDL)  Dysphagia: Grade 3  Nausea: Grade 2  Vomiting: Grade 1    Hemorrhage/Bleeding  Assessment: Yes (Within Defined Limits)    Infection  Assessment: Yes (Within Defined Limits)    Lymphatics  Assessment: Yes (Within Defined Limits)    Musculoskeletal  Assessment: Yes (Within Defined Limits)    Neurology  Assessment: Yes (Within Defined Limits)    Ocular  Assessment: Yes (Within Defined Limits)    Pain  Assessment: Yes (w/ Exceptions to WDL)  Pain: Grade 1    Pulmonary/Upper Respiratory  Assessment: Yes (w/ Exceptions to WDL) (sore throat, copious amount of phlegm)    Genitourinary  Assessment: Yes (Within Defined Limits)        Is this patient status post Stem Cell Transplant? No    Vitals:  Orthostatic BP Reading:  Vitals:    09/18/23 1020   BP: 112/72   BP Location: LUE - Left upper extremity   Patient Position: Sitting   Cuff Size: Regular   Pulse: 76   Resp: 16   Temp: 97.5 °F (36.4 °C)   TempSrc: Temporal   SpO2: 99%   Weight: 77.2 kg (170 lb 1.6 oz)   Height: 5' 11\" (1.803 m)   PainSc: 3    PainLoc: Abdomen       Weight:  Wt Readings from Last 1 Encounters:   09/18/23 77.2 kg (170 lb 1.6 oz)     Labs:  Sodium (mmol/L)   Date Value   09/18/2023 135     Potassium (mmol/L)   Date Value   09/18/2023 3.0 (L)     Chloride (mmol/L)   Date Value   09/18/2023 101     Glucose (mg/dL)   Date Value   09/18/2023 175 (H)     Calcium (mg/dL)   Date Value   09/18/2023 8.3 (L)     Carbon Dioxide (mmol/L)   Date Value   09/18/2023 24     BUN (mg/dL)   Date Value   09/18/2023 15     Creatinine (mg/dL)   Date Value   09/18/2023 0.86     Magnesium (mg/dL)   Date Value   09/18/2023 1.9       Central Line Care: See Invasive (Oncology) Lines Flowsheet and MAR for CVAD documentation as appropriate.    Post Treatment: Treatment tolerated well; no adverse reaction    Education: Patient Education: No new instructions needed    Patient Discharged: patient discharged to home per self, ambulatory, with family member    Next appointment scheduled: 9/20/2023    Per MD's LOS  IVF with potassium today and antiemetics/dex give, see MAR  Recheck BMP weds with fluids RN visit May need potassiumLab orders entered. Lab on 9/20/2023 at 135, TX at  1345  Friday and Sunday RN visit and fluids. TX on 9/22 at 1300, TX on 9/24/2023 at 0900.    Next week Tuesday ML visit cbc, cmp, mg fluids. Lab orders entered. Lab on 9/26/2023 at 1320, APC at 1400, TX at 1110        Patient instructed to call the office with any questions or concerns.  Dr. Ring is supervising clinician today.

## 2024-03-04 NOTE — ASSESSMENT & PLAN NOTE
Toribio is obese and may have obstructive sleep apnea.  He is trying to lose weight and recently saw dietitian.  I did encourage him in his efforts at continued weight loss

## 2024-03-04 NOTE — ASSESSMENT & PLAN NOTE
Although is not complain of excessive daytime somnolence he is significant overweight and Mallampati score is 3.  He does snore.  He does have obstructive sleep apnea.  I did discuss diagnosis and treatment of JASON with him.  I will order an in lab diagnostic sleep study.  He was agreeable to this.

## 2024-03-09 DIAGNOSIS — E66.01 CLASS 3 SEVERE OBESITY DUE TO EXCESS CALORIES WITH BODY MASS INDEX (BMI) OF 40.0 TO 44.9 IN ADULT, UNSPECIFIED WHETHER SERIOUS COMORBIDITY PRESENT (HCC): ICD-10-CM

## 2024-03-09 DIAGNOSIS — R06.83 SNORING: Primary | ICD-10-CM

## 2024-03-27 ENCOUNTER — TELEPHONE (OUTPATIENT)
Dept: BARIATRICS | Facility: CLINIC | Age: 19
End: 2024-03-27

## 2024-05-09 NOTE — PROGRESS NOTES
ADULT ANNUAL PHYSICAL  [facility] [place]    NAME: Toribio Mack  AGE: 18 y.o. SEX: male  : 2005     DATE: 5/10/2024     Assessment and Plan:     Problem List Items Addressed This Visit        Respiratory    Other sleep apnea     No excessive daytime sleepiness we will however patient's weight is significantly high at 349 pounds history of snoring pulmonary note appreciated patient was supposed to get a sleep study not done yet I recommend patient to go back to the pulmonary physician regarding getting the sleep study done.            Other    Class 3 severe obesity due to excess calories without serious comorbidity with body mass index (BMI) of 40.0 to 44.9 in adult (HCC) - Primary     Pulmonary note appreciated dietary note weight management note appreciated patient needs to follow strict diet and exercise and follow-up with the weight management lifestyle modification lose weight.         Snoring       Immunizations and preventive care screenings were discussed with patient today. Appropriate education was printed on patient's after visit summary.    Counseling:  Exercise: the importance of regular exercise/physical activity was discussed. Recommend exercise 3-5 times per week for at least 30 minutes.          Return in about 4 months (around 9/10/2024).     Chief Complaint:   Patient is coming here for a follow-up evaluation with regards to his symptoms of obesity and also possibility of sleep apnea  Chief Complaint   Patient presents with   • Annual Exam      History of Present Illness:   Patient was seen by the weight management clinic recommended diet cutting back calorie intake however patient has started a new job and was not able to do much exercise and has not lost much weight.  Patient was also seen by the pulmonary physician recommended sleep study not done yet.  Adult Annual Physical   Patient here for a comprehensive physical exam. The  patient reports no problems.    Diet and Physical Activity  Diet/Nutrition: poor diet, frequent junk food, and limited fruits/vegetables.   Exercise: no formal exercise.      Depression Screening  PHQ-2/9 Depression Screening         General Health  Sleep: sleeps well, gets 7-8 hours of sleep on average, and snores loudly.   Hearing: normal - bilateral.  Vision: Wears glasses had last eye checkup less than a year ago.   Dental: no dental visits for >1 year, brushes teeth twice daily, and does not floss.        Health  History of STDs?: no.    Advanced Care Planning  Do you have an advanced directive? no  Do you have a durable medical power of ? no  ACP document given to the patient? yes     Review of Systems:     Review of Systems   Constitutional:  Negative for chills and fever.   HENT:  Negative for ear pain and sore throat.    Eyes:  Negative for pain and visual disturbance.   Respiratory:  Negative for cough and shortness of breath.    Cardiovascular:  Negative for chest pain and palpitations.   Gastrointestinal:  Negative for abdominal pain and vomiting.   Genitourinary:  Negative for dysuria and hematuria.   Musculoskeletal:  Negative for arthralgias and back pain.   Skin:  Negative for color change and rash.   Neurological:  Negative for seizures and syncope.   All other systems reviewed and are negative.     Past Medical History:     Past Medical History:   Diagnosis Date   • Abscess of toe 1/20/2017   • Ingrowing nail 7/28/2016      Past Surgical History:     Past Surgical History:   Procedure Laterality Date   • CIRCUMCISION     • TYMPANOSTOMY TUBE PLACEMENT Bilateral       Social History:     Social History     Socioeconomic History   • Marital status: Single     Spouse name: None   • Number of children: None   • Years of education: None   • Highest education level: None   Occupational History   • None   Tobacco Use   • Smoking status: Never   • Smokeless tobacco: Never   Vaping Use   • Vaping  "status: Never Used   Substance and Sexual Activity   • Alcohol use: Not Currently   • Drug use: Not Currently   • Sexual activity: Not Currently   Other Topics Concern   • None   Social History Narrative    9th grade    Musical instrument     Social Determinants of Health     Financial Resource Strain: Not on file   Food Insecurity: Not on file   Transportation Needs: Not on file   Physical Activity: Not on file   Stress: Not on file   Social Connections: Not on file   Intimate Partner Violence: Not on file   Housing Stability: Not on file      Family History:     Family History   Problem Relation Age of Onset   • Diabetes Mother    • Hypertension Father    • Anxiety disorder Father       Current Medications:     No current outpatient medications on file.     No current facility-administered medications for this visit.      Allergies:     No Known Allergies   Physical Exam:     /70 (BP Location: Right arm, Patient Position: Sitting, Cuff Size: Large)   Pulse (!) 114   Ht 6' 4\" (1.93 m)   Wt (!) 158 kg (349 lb)   SpO2 98%   BMI 42.48 kg/m²     Physical Exam  Vitals and nursing note reviewed.   Constitutional:       General: He is not in acute distress.     Appearance: He is well-developed. He is obese.   HENT:      Head: Normocephalic and atraumatic.   Eyes:      Conjunctiva/sclera: Conjunctivae normal.   Cardiovascular:      Rate and Rhythm: Normal rate and regular rhythm.      Heart sounds: No murmur heard.  Pulmonary:      Effort: Pulmonary effort is normal. No respiratory distress.      Breath sounds: Normal breath sounds.   Abdominal:      Palpations: Abdomen is soft.      Tenderness: There is no abdominal tenderness.   Musculoskeletal:         General: No swelling.      Cervical back: Neck supple.   Skin:     General: Skin is warm and dry.      Capillary Refill: Capillary refill takes less than 2 seconds.   Neurological:      Mental Status: He is alert.   Psychiatric:         Mood and Affect: Mood " normal.      No results found for this or any previous visit (from the past 1344 hour(s)).     Mike Pate MD   [facility] [place]

## 2024-05-10 ENCOUNTER — OFFICE VISIT (OUTPATIENT)
Dept: FAMILY MEDICINE CLINIC | Facility: CLINIC | Age: 19
End: 2024-05-10
Payer: COMMERCIAL

## 2024-05-10 VITALS
HEART RATE: 114 BPM | DIASTOLIC BLOOD PRESSURE: 70 MMHG | BODY MASS INDEX: 38.36 KG/M2 | OXYGEN SATURATION: 98 % | SYSTOLIC BLOOD PRESSURE: 126 MMHG | WEIGHT: 315 LBS | HEIGHT: 76 IN

## 2024-05-10 DIAGNOSIS — R06.83 SNORING: ICD-10-CM

## 2024-05-10 DIAGNOSIS — E66.01 CLASS 3 SEVERE OBESITY DUE TO EXCESS CALORIES WITHOUT SERIOUS COMORBIDITY WITH BODY MASS INDEX (BMI) OF 40.0 TO 44.9 IN ADULT (HCC): Primary | ICD-10-CM

## 2024-05-10 DIAGNOSIS — Z00.00 MEDICARE ANNUAL WELLNESS VISIT, SUBSEQUENT: ICD-10-CM

## 2024-05-10 DIAGNOSIS — G47.39 OTHER SLEEP APNEA: ICD-10-CM

## 2024-05-10 DIAGNOSIS — Z00.00 ANNUAL PHYSICAL EXAM: ICD-10-CM

## 2024-05-10 PROCEDURE — 99395 PREV VISIT EST AGE 18-39: CPT | Performed by: INTERNAL MEDICINE

## 2024-05-10 NOTE — ASSESSMENT & PLAN NOTE
Pulmonary note appreciated dietary note weight management note appreciated patient needs to follow strict diet and exercise and follow-up with the weight management lifestyle modification lose weight.

## 2024-05-10 NOTE — ASSESSMENT & PLAN NOTE
No excessive daytime sleepiness we will however patient's weight is significantly high at 349 pounds history of snoring pulmonary note appreciated patient was supposed to get a sleep study not done yet I recommend patient to go back to the pulmonary physician regarding getting the sleep study done.

## 2024-09-06 NOTE — PROGRESS NOTES
Office Visit Note  24     Toribio Mack 18 y.o. male MRN: [id number]  : 2005    Assessment:     1. Class 3 severe obesity due to excess calories without serious comorbidity with body mass index (BMI) of 40.0 to 44.9 in adult ([facility])  Assessment & Plan:  Recommend follow-up with the weight management and dietitian regarding diet and exercise must cut back calorie intake lifestyle modification to lose weight  Orders:  -     Ambulatory referral to Weight Management; Future  2. Other sleep apnea  Assessment & Plan:  Patient weight of 347 pounds recommend very strongly to cut back on calorie intake follow-up with the weight management blood work repeated at a later date             Discussion Summary and Plan:  Today's care plan and medications were reviewed with patient in detail and all their questions answered to their satisfaction.    Chief Complaint   Patient presents with    Follow-up      Subjective:  Patient is coming here for a follow-up evaluation with regards to symptoms of sleep apnea and also with severe obesity.  Patient did not lose much weight in the recent past he missed an appointment with the weight management clinic also.  Patient is also going to be having a sleep study done in the month of October regarding sleep apnea.  Medications none labs reviewed.        The following portions of the patient's history were reviewed and updated as appropriate: allergies, current medications, past family history, past medical history, past social history, past surgical history and problem list.    Review of Systems   Constitutional:  Negative for chills and fever.   HENT:  Negative for ear pain and sore throat.    Eyes:  Negative for pain and visual disturbance.   Respiratory:  Negative for cough and shortness of breath.    Cardiovascular:  Negative for chest pain and palpitations.   Gastrointestinal:  Negative for abdominal pain and vomiting.   Genitourinary:  Negative for dysuria and  "hematuria.   Musculoskeletal:  Negative for arthralgias and back pain.   Skin:  Negative for color change and rash.   Neurological:  Negative for seizures and syncope.   All other systems reviewed and are negative.        Historical Information   Patient Active Problem List   Diagnosis    Class 3 severe obesity due to excess calories without serious comorbidity with body mass index (BMI) of 40.0 to 44.9 in adult (HCC)    Other sleep apnea    Snoring     Past Medical History:   Diagnosis Date    Abscess of toe 1/20/2017    Ingrowing nail 7/28/2016     Past Surgical History:   Procedure Laterality Date    CIRCUMCISION      TYMPANOSTOMY TUBE PLACEMENT Bilateral      Social History     Substance and Sexual Activity   Alcohol Use Not Currently     Social History     Substance and Sexual Activity   Drug Use Not Currently     Social History     Tobacco Use   Smoking Status Never   Smokeless Tobacco Never     Family History   Problem Relation Age of Onset    Diabetes Mother     Hypertension Father     Anxiety disorder Father      Health Maintenance Due   Topic    COVID-19 Vaccine (3 - 2023-24 season)    Influenza Vaccine (1)    Depression Screening       Meds/Allergies     No current outpatient medications on file.      Objective:    Vitals:   /70 (BP Location: Right arm, Patient Position: Sitting, Cuff Size: Large)   Pulse 90   Ht 6' 4\" (1.93 m)   Wt (!) 158 kg (347 lb 9.6 oz)   SpO2 98%   BMI 42.31 kg/m²   Body mass index is 42.31 kg/m².  Vitals:    09/09/24 0851   Weight: (!) 158 kg (347 lb 9.6 oz)       Physical Exam  Vitals and nursing note reviewed.   Constitutional:       Appearance: Normal appearance. He is obese.   Cardiovascular:      Rate and Rhythm: Normal rate and regular rhythm.      Heart sounds: Normal heart sounds.   Pulmonary:      Effort: Pulmonary effort is normal.      Breath sounds: Normal breath sounds.   Abdominal:      Palpations: Abdomen is soft.   Musculoskeletal:         General: " "Normal range of motion.      Right lower leg: No edema.      Left lower leg: No edema.   Skin:     General: Skin is warm.      Capillary Refill: Capillary refill takes less than 2 seconds.   Neurological:      General: No focal deficit present.      Mental Status: He is alert and oriented to person, place, and time.   Psychiatric:         Mood and Affect: Mood normal.         Behavior: Behavior normal.         Lab Review   No visits with results within 2 Month(s) from this visit.   Latest known visit with results is:   Orders Only on 11/15/2023   Component Date Value Ref Range Status    Specific Gravity 11/15/2023 1.026  1.005 - 1.030 Final    Ph 11/15/2023 6.0  5.0 - 7.5 Final    Color UA 11/15/2023 Yellow  Yellow Final    Urine Appearance 11/15/2023 Clear  Clear Final    Leukocyte Esterase 11/15/2023 Negative  Negative Final    Protein 11/15/2023 Negative  Negative/Trace Final    Glucose, 24 HR Urine 11/15/2023 Negative  Negative Final    Ketone, Urine 11/15/2023 Negative  Negative Final    Blood, Urine 11/15/2023 Negative  Negative Final    Bilirubin, Urine 11/15/2023 Negative  Negative Final    Urobilinogen Urine 11/15/2023 0.2  0.2 - 1.0 mg/dL Final    Nitrites Urine 11/15/2023 Negative  Negative Final    Microscopic Examination 11/15/2023 Comment   Final    Microscopic follows if indicated.    Microscopic Examination 11/15/2023 See below:   Final    Microscopic was indicated and was performed.    Urinalysis Reflex 11/15/2023 Comment   Final    This specimen will not reflex to a Urine Culture.    SL AMB WBC, URINE 11/15/2023 None seen  0 - 5 /hpf Final    RBC, Urine 11/15/2023 None seen  0 - 2 /hpf Final    Epithelial Cells (non renal) 11/15/2023 None seen  0 - 10 /hpf Final    Casts 11/15/2023 None seen  None seen /lpf Final    Bacteria, Urine 11/15/2023 None seen  None seen/Few Final         Mike Pate MD        \"This note has been constructed using a voice recognition system.Therefore there may be " "syntax, spelling, and/or grammatical errors. Please call if you have any questions. \"  "

## 2024-09-09 ENCOUNTER — OFFICE VISIT (OUTPATIENT)
Dept: FAMILY MEDICINE CLINIC | Facility: CLINIC | Age: 19
End: 2024-09-09
Payer: COMMERCIAL

## 2024-09-09 VITALS
WEIGHT: 315 LBS | HEIGHT: 76 IN | DIASTOLIC BLOOD PRESSURE: 70 MMHG | BODY MASS INDEX: 38.36 KG/M2 | SYSTOLIC BLOOD PRESSURE: 122 MMHG | HEART RATE: 90 BPM | OXYGEN SATURATION: 98 %

## 2024-09-09 DIAGNOSIS — E66.01 CLASS 3 SEVERE OBESITY DUE TO EXCESS CALORIES WITHOUT SERIOUS COMORBIDITY WITH BODY MASS INDEX (BMI) OF 40.0 TO 44.9 IN ADULT (HCC): Primary | ICD-10-CM

## 2024-09-09 DIAGNOSIS — G47.39 OTHER SLEEP APNEA: ICD-10-CM

## 2024-09-09 PROCEDURE — 99213 OFFICE O/P EST LOW 20 MIN: CPT | Performed by: INTERNAL MEDICINE

## 2024-09-09 NOTE — ASSESSMENT & PLAN NOTE
Patient weight of 347 pounds recommend very strongly to cut back on calorie intake follow-up with the weight management blood work repeated at a later date

## 2024-09-09 NOTE — ASSESSMENT & PLAN NOTE
Recommend follow-up with the weight management and dietitian regarding diet and exercise must cut back calorie intake lifestyle modification to lose weight

## 2024-09-27 ENCOUNTER — TELEPHONE (OUTPATIENT)
Dept: BARIATRICS | Facility: CLINIC | Age: 19
End: 2024-09-27

## 2024-10-09 ENCOUNTER — OFFICE VISIT (OUTPATIENT)
Dept: URGENT CARE | Facility: CLINIC | Age: 19
End: 2024-10-09
Payer: COMMERCIAL

## 2024-10-09 VITALS
SYSTOLIC BLOOD PRESSURE: 136 MMHG | HEIGHT: 76 IN | RESPIRATION RATE: 18 BRPM | HEART RATE: 84 BPM | DIASTOLIC BLOOD PRESSURE: 84 MMHG | BODY MASS INDEX: 38.36 KG/M2 | WEIGHT: 315 LBS | TEMPERATURE: 97.6 F | OXYGEN SATURATION: 98 %

## 2024-10-09 DIAGNOSIS — G47.33 OSA (OBSTRUCTIVE SLEEP APNEA): Primary | ICD-10-CM

## 2024-10-09 DIAGNOSIS — Z23 ENCOUNTER FOR IMMUNIZATION: ICD-10-CM

## 2024-10-09 DIAGNOSIS — S81.832A: Primary | ICD-10-CM

## 2024-10-09 PROCEDURE — 90471 IMMUNIZATION ADMIN: CPT

## 2024-10-09 PROCEDURE — 90715 TDAP VACCINE 7 YRS/> IM: CPT

## 2024-10-09 PROCEDURE — 99213 OFFICE O/P EST LOW 20 MIN: CPT

## 2024-10-09 NOTE — PROGRESS NOTES
[facility]        NAME: Toribio Mack is a 18 y.o. male  : 2005    MRN: [id number]  DATE: 2024  TIME: 2:01 PM    Assessment and Plan   Puncture wound of left calf [S81.832A]  1. Puncture wound of left calf        2. Encounter for immunization  Tdap Vaccine greater than or equal to 8yo        Small scratch/puncture wound from metal. Tetanus updated. Local wound care.     Patient Instructions     Keep wound clean with mild soap and water   Cover with bacitracin ointment and bandages   Monitor for signs of infection     Follow up with PCP in 3-5 days.  Proceed to  ER if symptoms worsen.      If tests are performed, our office will contact you with results only if changes need to made to the care plan discussed with you at the visit. You can review your full results on [facility].    Chief Complaint     Chief Complaint   Patient presents with    leg wound     Pt states that he was punctured by metal furniture on his left lower leg.         History of Present Illness       Was at the movie [employer] yesterday and cut his calf on the metal chair. Last tetanus , here to have it updated. Denies any signs of infection.         Review of Systems   Review of Systems   Constitutional:  Negative for chills and fever.   HENT:  Negative for congestion, postnasal drip, rhinorrhea, sinus pressure, sore throat and trouble swallowing.    Respiratory:  Negative for cough, chest tightness and shortness of breath.    Cardiovascular:  Negative for chest pain and palpitations.   Gastrointestinal:  Negative for abdominal pain, nausea and vomiting.   Genitourinary:  Negative for difficulty urinating.   Musculoskeletal:  Negative for myalgias.   Neurological:  Negative for dizziness and headaches.         Current Medications     No current outpatient medications on file.    Current Allergies     Allergies as of 10/09/2024    (No Known Allergies)            The following portions of the patient's  "history were reviewed and updated as appropriate: allergies, current medications, past family history, past medical history, past social history, past surgical history and problem list.     Past Medical History:   Diagnosis Date    Abscess of toe 1/20/2017    Ingrowing nail 7/28/2016       Past Surgical History:   Procedure Laterality Date    CIRCUMCISION      TYMPANOSTOMY TUBE PLACEMENT Bilateral        Family History   Problem Relation Age of Onset    Diabetes Mother     Hypertension Father     Anxiety disorder Father          Medications have been verified.        Objective   /84   Pulse 84   Temp 97.6 °F (36.4 °C)   Resp 18   Ht 6' 4\" (1.93 m)   Wt (!) 159 kg (351 lb)   SpO2 98%   BMI 42.73 kg/m²        Physical Exam     Physical Exam  Constitutional:       General: He is not in acute distress.  HENT:      Head: Normocephalic.      Nose: Nose normal.   Eyes:      Pupils: Pupils are equal, round, and reactive to light.   Cardiovascular:      Rate and Rhythm: Normal rate and regular rhythm.      Pulses: Normal pulses.      Heart sounds: Normal heart sounds.   Pulmonary:      Effort: Pulmonary effort is normal.      Breath sounds: Normal breath sounds.   Abdominal:      General: Abdomen is flat.   Musculoskeletal:         General: Normal range of motion.   Skin:     General: Skin is warm and dry.      Capillary Refill: Capillary refill takes less than 2 seconds.      Comments: Scratch wound to left calf, no swelling, erythema, warmth, or swelling. No active bleeding.    Neurological:      Mental Status: He is alert and oriented to person, place, and time.                   "

## 2024-10-14 ENCOUNTER — TRANSCRIBE ORDERS (OUTPATIENT)
Dept: SLEEP CENTER | Facility: CLINIC | Age: 19
End: 2024-10-14

## 2024-10-14 DIAGNOSIS — G47.33 OSA (OBSTRUCTIVE SLEEP APNEA): Primary | ICD-10-CM

## 2024-10-16 ENCOUNTER — OFFICE VISIT (OUTPATIENT)
Dept: BARIATRICS | Facility: CLINIC | Age: 19
End: 2024-10-16
Payer: COMMERCIAL

## 2024-10-16 VITALS
HEIGHT: 76 IN | SYSTOLIC BLOOD PRESSURE: 138 MMHG | DIASTOLIC BLOOD PRESSURE: 80 MMHG | BODY MASS INDEX: 38.36 KG/M2 | HEART RATE: 89 BPM | WEIGHT: 315 LBS

## 2024-10-16 DIAGNOSIS — E66.01 CLASS 3 SEVERE OBESITY DUE TO EXCESS CALORIES WITHOUT SERIOUS COMORBIDITY WITH BODY MASS INDEX (BMI) OF 40.0 TO 44.9 IN ADULT (HCC): ICD-10-CM

## 2024-10-16 DIAGNOSIS — E66.813 CLASS 3 SEVERE OBESITY DUE TO EXCESS CALORIES WITHOUT SERIOUS COMORBIDITY WITH BODY MASS INDEX (BMI) OF 40.0 TO 44.9 IN ADULT (HCC): ICD-10-CM

## 2024-10-16 PROCEDURE — 99215 OFFICE O/P EST HI 40 MIN: CPT | Performed by: INTERNAL MEDICINE

## 2024-10-16 NOTE — PROGRESS NOTES
"  Program: [facility]    Assessment/Plan     Toribio Mack  is a 18 y.o. male with  returns to follow up  for treatment of excess body weight and associated risk factors/co-morbidities.     Class 3 severe obesity due to excess calories without serious comorbidity with body mass index (BMI) of 40.0 to 44.9 in adult ([facility])  Encourage patient to make small changes as he reports he struggles significantly with motivation.  I offered him a visit to the  and he declined.  He does not have the resources to work with the dietitian as well.  He is still of the opinion that he does not want to do any medication including oral generic.  He expressed grief over losing his uncle and is concerned about his own health.  He states \"I know I can do it\".  He also reports that he finds himself falling into the same patterns.  Provided small changes he can incorporate  - brisk 30 min walk 5 days a week  -Cutting back soda to half current consumption.  -Increase hydration  - body  weight exercises 2-3 times a week or resistance bands using YouTube videos  -Reviewed my plate method   -Lists of protein sources, protein shakes protein snacks provided  -Also a menu of 1600 to 1800 laila provided  -Incorporate vegetables and work on cutting portion sizes in half  -Cut bedtime back by 30 minutes each week.  -Discussed with patient that he could continue lifestyle changes using community resources.  He does not have to return to medical weight management if he chooses to continue not to use medication.  Patient verbalized understanding.       Most recent notes and records were reviewed.         Return visit:  2-3 months     Nutrition   Do not skip meals. Avoid sugary beverages. At least 64oz of water daily.    Behavioral/Stress   Food log via selam or provided paper log (selam options include www.myfitnesspal.com, sparkpeople.com, ReachDynamicsit.com, calorieking.com, bariBureau [employer]). Encouraged mindful eating    Physical " "Activity  Increase physical activity by 10 minutes daily. Gradually increase physical activity to a goal of 5 days per week for 30 minutes of MODERATE intensity ( ( should be able to pass the \"talk test\" but should not be able to sing.  target 150-300 minutes  PLUS 2-3 days per week of FULL BODY resistance training. Progression will be addressed at follow up visits. Encouraged planning regarding establishing physical activity routine    Sleep  Provided sleep hygiene counseling and importance of having adequate sleep duration          Toribio was seen today for follow-up.    Diagnoses and all orders for this visit:    Class 3 severe obesity due to excess calories without serious comorbidity with body mass index (BMI) of 40.0 to 44.9 in adult (HCC)  -     Ambulatory referral to Weight Management                Total time spent reviewing chart, interviewing patient, examining patient, discussing plan, answering all questions, and documentin minutes with >50% face-to-face time with the patient.            Weight trajectory     Wt Readings from Last 20 Encounters:   10/16/24 (!) 157 kg (346 lb 12.8 oz)   10/09/24 (!) 159 kg (351 lb)   24 (!) 158 kg (347 lb 9.6 oz)   05/10/24 (!) 158 kg (349 lb)   24 (!) 159 kg (350 lb 9.6 oz)   24 (!) 160 kg (351 lb 12.8 oz)   24 (!) 161 kg (356 lb)   23 (!) 161 kg (355 lb) (>99%, Z= 3.59)*   23 (!) 164 kg (362 lb) (>99%, Z= 3.64)*   22 (!) 161 kg (356 lb) (>99%, Z= 3.54)*   22 (!) 161 kg (356 lb) (>99%, Z= 3.54)*   10/13/22 (!) 161 kg (356 lb) (>99%, Z= 3.53)*   22 (!) 161 kg (356 lb) (>99%, Z= 3.52)*   22 (!) 161 kg (356 lb) (>99%, Z= 3.52)*   22 (!) 161 kg (356 lb) (>99%, Z= 3.51)*   22 (!) 161 kg (356 lb) (>99%, Z= 3.51)*   21 (!) 161 kg (356 lb) (>99%, Z= 3.51)*   20 (!) 145 kg (319 lb 6.4 oz) (>99%, Z= 3.46)*   03/10/20 (!) 140 kg (309 lb) (>99%, Z= 3.44)*   20 (!) 142 kg (312 lb) " "(>99%, Z= 3.47)*     * Growth percentiles are based on CDC (Boys, 2-20 Years) data.               Lifestyle questionnaire     Patient reports since last office visit in February 2024-\"I am trying to watch what I eat\".  He is unable to completely express the concrete changes he has made  Diet recall:  B: skips  L: fast food or italian chicken parm finger and fries  D: mom cooks italian pasta meatballs   S: none  Eating out vs cooking at home- 2x/ week     Beverages  Water--   one bottle ( 16 oz)  Caffeine/tea--    none  SSB 16 oz diet or regular pepsi     Alcohol: none  Smoking: none  Drug use: none     Physical Activity -- hiking   Sleep -- STOP- BANG- 4/8; 12 AM- 8 AM     Occupation- delivers food for [employer]  Psycho social- live with parents          Start date: 02/28/24  Current weight : 351 lbs  Current BMI: 42.94 kg/m2  Obesity Class: Above 40- Obesity Class III  Goal weight: < 300  lbs        Weight on 10/16/2024: 346 lbs  BMI on 10/16/2024: 42.3 kg/m2 Above 40- Obesity Class III  Difference: -5 lbs      Anti obesity Medications/ Medical---    Weight loss medication and dose: None  Date initiated:               Objective         The following portions of the patient's history were reviewed and updated as appropriate: allergies, current medications, past family history, past medical history, past social history, past surgical history, and problem list.      /80   Pulse 89   Ht 6' 3.9\" (1.928 m)   Wt (!) 157 kg (346 lb 12.8 oz)   BMI 42.33 kg/m²             Review of Systems   Constitutional:  Negative for fatigue.   HENT:  Negative for sore throat.    Respiratory:  Negative for cough and shortness of breath.    Cardiovascular:  Negative for chest pain, palpitations and leg swelling.   Gastrointestinal:  Negative for abdominal pain, constipation, diarrhea and nausea.   Genitourinary:  Negative for dysuria.   Musculoskeletal:  Negative for arthralgias and back pain.   Skin:  Negative for rash. "   Neurological:  Negative for headaches.   Psychiatric/Behavioral:  Negative for dysphoric mood. The patient is not nervous/anxious.          Physical Exam  Vitals and nursing note reviewed.   Constitutional:       Appearance: Normal appearance.   HENT:      Head: Normocephalic.   Pulmonary:      Effort: Pulmonary effort is normal.   Neurological:      General: No focal deficit present.      Mental Status: She is alert and oriented to person, place, and time.   Psychiatric:         Mood and Affect: Mood normal.         Behavior: Behavior normal.         Thought Content: Thought content normal.         Judgment: Judgment normal.          Current medications     No current outpatient medications on file.         Medication considerations/contraindications     -Patient denies personal history of pancreatitis. Patient also denies personal and family history of medullary thyroid cancer, and multiple endocrine neoplasia type 2 (MEN 2 tumor). -Patient denies any history of kidney stones, seizures, or glaucoma, diabetic retinopathy, gall bladder disease, gastroparesis, hyperthyroidism.  -Denies Hx of CAD, PAD, palpitations, arrhythmia, uncontrolled hypertension  -Denies uncontrolled anxiety or depression, suicidal behavior or thinking , insomnia or sleep disturbance.         Labs     Most recent labs reviewed   Lab Results   Component Value Date    SODIUM 140 11/15/2023    K 4.2 11/15/2023     11/15/2023    CO2 22 11/15/2023    BUN 10 11/15/2023    CREATININE 0.85 11/15/2023    GLUC 98 11/15/2023    AST 17 11/15/2023    ALT 24 11/15/2023    TP 6.9 11/15/2023    TBILI 0.4 11/15/2023    EGFR 128 11/15/2023     Lab Results   Component Value Date    HGBA1C 5.6 11/15/2023     Lab Results   Component Value Date    TSH 2.930 11/15/2023     Lab Results   Component Value Date    CHOLESTEROL 149 11/15/2023     Lab Results   Component Value Date    HDL 36 (L) 11/15/2023     Lab Results   Component Value Date    TRIG 131  "11/15/2023     Lab Results   Component Value Date    LDLCALC 90 11/15/2023     No results found for: \"VITD\"  No components found for: \"FASTINS\"   "

## 2024-10-16 NOTE — ASSESSMENT & PLAN NOTE
"Encourage patient to make small changes as he reports he struggles significantly with motivation.  I offered him a visit to the  and he declined.  He does not have the resources to work with the dietitian as well.  He is still of the opinion that he does not want to do any medication including oral generic.  He expressed grief over losing his uncle and is concerned about his own health.  He states \"I know I can do it\".  He also reports that he finds himself falling into the same patterns.  Provided small changes he can incorporate  - brisk 30 min walk 5 days a week  -Cutting back soda to half current consumption.  -Increase hydration  - body  weight exercises 2-3 times a week or resistance bands using YouTube videos  -Reviewed my plate method   -Lists of protein sources, protein shakes protein snacks provided  -Also a menu of 1600 to 1800 laila provided  -Incorporate vegetables and work on cutting portion sizes in half  -Cut bedtime back by 30 minutes each week.  -Discussed with patient that he could continue lifestyle changes using community resources.  He does not have to return to medical weight management if he chooses to continue not to use medication.  Patient verbalized understanding.  "

## 2024-11-05 ENCOUNTER — HOSPITAL ENCOUNTER (OUTPATIENT)
Dept: SLEEP CENTER | Facility: CLINIC | Age: 19
Discharge: HOME | End: 2024-11-05
Payer: COMMERCIAL

## 2024-11-05 DIAGNOSIS — G47.33 OSA (OBSTRUCTIVE SLEEP APNEA): ICD-10-CM

## 2024-11-05 PROCEDURE — 95806 SLEEP STUDY UNATT&RESP EFFT: CPT

## 2024-11-05 PROCEDURE — G0399 HOME SLEEP TEST/TYPE 3 PORTA: HCPCS

## 2024-11-05 NOTE — PROGRESS NOTES
Home Sleep Study Documentation    HOME STUDY DEVICE: Noxturnal no                                           Yaa G3 yes device # 3      Pre-Sleep Home Study:    Set-up and instructions performed by: Clau    Technician performed demonstration for Patient: yes    Return demonstration performed by Patient: yes    Written instructions provided to Patient: yes    Patient signed consent form: yes        Post-Sleep Home Study:    Additional comments by Patient: None    Home Sleep Study Failed:no:    Failure reason: N/A    Reported or Detected: N/A    Scored by: AINSLEY Harden

## 2024-11-11 ENCOUNTER — OFFICE VISIT (OUTPATIENT)
Dept: URGENT CARE | Facility: CLINIC | Age: 19
End: 2024-11-11
Payer: COMMERCIAL

## 2024-11-11 VITALS
BODY MASS INDEX: 41.74 KG/M2 | WEIGHT: 315 LBS | TEMPERATURE: 98.2 F | HEART RATE: 79 BPM | RESPIRATION RATE: 16 BRPM | OXYGEN SATURATION: 98 %

## 2024-11-11 DIAGNOSIS — L81.2 FRECKLE: Primary | ICD-10-CM

## 2024-11-11 PROCEDURE — 99213 OFFICE O/P EST LOW 20 MIN: CPT | Performed by: PHYSICIAN ASSISTANT

## 2024-11-11 NOTE — PROGRESS NOTES
[facility]        NAME: Toribio Mack is a 19 y.o. male  : 2005    MRN: [id number]  DATE: 2024  TIME: 12:40 PM    Assessment and Plan   Freckle [L81.2]  1. Freckle  Ambulatory Referral to Dermatology        Patient has a new freckle.  I will refer to Derm for a skin check.    Patient Instructions     Patient Instructions   Follow-up with dermatology      Follow up with PCP in 3-5 days.  Proceed to  ER if symptoms worsen.    Chief Complaint     Chief Complaint   Patient presents with    blemish     Pt presents with a red spot on lower left side of lip/ first noted last night// denies pain or itch         History of Present Illness       The patient is a 19-year-old male presenting today after he noticed a freckle on his lower lip last night.  Denies any pain or itching.        Review of Systems   Review of Systems   Constitutional:  Negative for activity change, appetite change, chills, diaphoresis and fever.   HENT:  Negative for congestion, rhinorrhea and sore throat.    Respiratory:  Negative for cough, chest tightness and shortness of breath.    Cardiovascular:  Negative for chest pain and palpitations.   Gastrointestinal:  Negative for abdominal pain, diarrhea, nausea and vomiting.   Musculoskeletal:  Negative for arthralgias and myalgias.   Skin:  Positive for color change. Negative for pallor.   Neurological:  Negative for headaches.         Current Medications     No current outpatient medications on file.    Current Allergies     Allergies as of 2024    (No Known Allergies)            The following portions of the patient's history were reviewed and updated as appropriate: allergies, current medications, past family history, past medical history, past social history, past surgical history and problem list.     Past Medical History:   Diagnosis Date    Abscess of toe 2017    Ingrowing nail 2016       Past Surgical History:   Procedure Laterality Date     CIRCUMCISION      TYMPANOSTOMY TUBE PLACEMENT Bilateral        Family History   Problem Relation Age of Onset    Diabetes Mother     Hypertension Father     Anxiety disorder Father          Medications have been verified.        Objective   Pulse 79   Temp 98.2 °F (36.8 °C)   Resp 16   Wt (!) 155 kg (342 lb)   SpO2 98%   BMI 41.74 kg/m²        Physical Exam     Physical Exam  Vitals and nursing note reviewed.   Constitutional:       General: He is not in acute distress.     Appearance: Normal appearance. He is normal weight. He is not ill-appearing, toxic-appearing or diaphoretic.   HENT:      Head: Normocephalic and atraumatic.      Mouth/Throat:      Mouth: Mucous membranes are moist.      Pharynx: Oropharynx is clear. No oropharyngeal exudate or posterior oropharyngeal erythema.      Comments: Small brown freckle on the patient's lower lip on the left side.  Small freckle on the left side of the upper lip.  Cardiovascular:      Rate and Rhythm: Normal rate and regular rhythm.      Heart sounds: Normal heart sounds. No murmur heard.     No friction rub. No gallop.   Pulmonary:      Effort: Pulmonary effort is normal. No respiratory distress.      Breath sounds: Normal breath sounds. No stridor. No wheezing, rhonchi or rales.   Chest:      Chest wall: No tenderness.   Musculoskeletal:      Cervical back: Normal range of motion.   Lymphadenopathy:      Cervical: No cervical adenopathy.   Skin:     General: Skin is warm and dry.      Capillary Refill: Capillary refill takes less than 2 seconds.   Neurological:      Mental Status: He is alert.

## 2024-11-12 PROCEDURE — 95806 SLEEP STUDY UNATT&RESP EFFT: CPT | Performed by: INTERNAL MEDICINE

## 2024-11-20 ENCOUNTER — TELEPHONE (OUTPATIENT)
Dept: SLEEP CENTER | Facility: CLINIC | Age: 19
End: 2024-11-20

## 2024-11-20 NOTE — TELEPHONE ENCOUNTER
[facility]     Home sleep study resulted and did not show sleep apnea. Follow up recommended to review study results.     Results read by patient.     Called patient and left message advising I will send a Internet [employer] message with the sleep study results and next steps.  Provided sleep center number([phone]) to call if any questions.

## 2024-12-16 ENCOUNTER — OFFICE VISIT (OUTPATIENT)
Dept: FAMILY MEDICINE CLINIC | Facility: CLINIC | Age: 19
End: 2024-12-16
Payer: COMMERCIAL

## 2024-12-16 VITALS
BODY MASS INDEX: 38.36 KG/M2 | HEIGHT: 76 IN | WEIGHT: 315 LBS | HEART RATE: 102 BPM | SYSTOLIC BLOOD PRESSURE: 132 MMHG | DIASTOLIC BLOOD PRESSURE: 68 MMHG | OXYGEN SATURATION: 98 %

## 2024-12-16 DIAGNOSIS — Z23 ENCOUNTER FOR IMMUNIZATION: ICD-10-CM

## 2024-12-16 DIAGNOSIS — E66.813 CLASS 3 SEVERE OBESITY DUE TO EXCESS CALORIES WITHOUT SERIOUS COMORBIDITY WITH BODY MASS INDEX (BMI) OF 40.0 TO 44.9 IN ADULT (HCC): Primary | ICD-10-CM

## 2024-12-16 DIAGNOSIS — L98.9 SKIN LESION OF NECK: ICD-10-CM

## 2024-12-16 DIAGNOSIS — R06.83 SNORING: ICD-10-CM

## 2024-12-16 DIAGNOSIS — E66.01 CLASS 3 SEVERE OBESITY DUE TO EXCESS CALORIES WITHOUT SERIOUS COMORBIDITY WITH BODY MASS INDEX (BMI) OF 40.0 TO 44.9 IN ADULT (HCC): Primary | ICD-10-CM

## 2024-12-16 DIAGNOSIS — G47.39 OTHER SLEEP APNEA: ICD-10-CM

## 2024-12-16 PROCEDURE — 99214 OFFICE O/P EST MOD 30 MIN: CPT | Performed by: INTERNAL MEDICINE

## 2024-12-16 PROCEDURE — 90471 IMMUNIZATION ADMIN: CPT

## 2024-12-16 PROCEDURE — 90656 IIV3 VACC NO PRSV 0.5 ML IM: CPT

## 2024-12-16 NOTE — ASSESSMENT & PLAN NOTE
Patient noted to have 3 small skin lesion on the right side of the neck and also near the left side of the lower lip area he also noted small area on the penis.  Exam appears to be unremarkable patient has an appointment at the dermatologist next month based on the recommendations we make decisions.

## 2024-12-16 NOTE — ASSESSMENT & PLAN NOTE
Weight management physicians note appreciated patient weight remains the same at 346.  Recommend very strongly to follow strict diet must exercise on a daily basis and not just over the weekends follow-up with the weight management clinic patient may be a candidate for question Wegovy.

## 2024-12-16 NOTE — PROGRESS NOTES
Office Visit Note  24     Toribio Mack 19 y.o. male MRN: [id number]  : 2005    Assessment:     1. Class 3 severe obesity due to excess calories without serious comorbidity with body mass index (BMI) of 40.0 to 44.9 in adult (HCC)  Assessment & Plan:    Weight management physicians note appreciated patient weight remains the same at 346.  Recommend very strongly to follow strict diet must exercise on a daily basis and not just over the weekends follow-up with the weight management clinic patient may be a candidate for question Wegovy.  2. Skin lesion of neck  Assessment & Plan:  Patient noted to have 3 small skin lesion on the right side of the neck and also near the left side of the lower lip area he also noted small area on the penis.  Exam appears to be unremarkable patient has an appointment at the dermatologist next month based on the recommendations we make decisions.  3. Encounter for immunization  -     influenza vaccine preservative-free 0.5 mL IM (Fluzone, Afluria, Fluarix, Flulaval)  4. Other sleep apnea  5. Snoring  Assessment & Plan:  Patient's home study for the sleep apnea came back negative        Depression Screening and Follow-up Plan: Patient was screened for depression during today's encounter. They screened negative with a PHQ-2 score of 0.          Discussion Summary and Plan:  Today's care plan and medications were reviewed with patient in detail and all their questions answered to their satisfaction.    Chief Complaint   Patient presents with    Follow-up      Subjective:  Patient is coming here for a follow-up evaluation regarding his obesity for which she has been followed in the weight management clinic.  Patient is currently going for a walk over the weekend on the about 2 miles every day on Saturday and .  Medications currently patient is not on any medications labs reviewed.  Patient had a sleep study done home study which appears to be normal        The  following portions of the patient's history were reviewed and updated as appropriate: allergies, current medications, past family history, past medical history, past social history, past surgical history and problem list.    Review of Systems   Constitutional:  Negative for chills and fever.   HENT:  Negative for ear pain and sore throat.    Eyes:  Negative for pain and visual disturbance.   Respiratory:  Negative for cough and shortness of breath.    Cardiovascular:  Negative for chest pain and palpitations.   Gastrointestinal:  Negative for abdominal pain and vomiting.   Genitourinary:  Negative for dysuria and hematuria.   Musculoskeletal:  Negative for arthralgias and back pain.   Skin:  Negative for color change and rash.   Neurological:  Negative for seizures and syncope.   All other systems reviewed and are negative.        Historical Information   Patient Active Problem List   Diagnosis    Class 3 severe obesity due to excess calories without serious comorbidity with body mass index (BMI) of 40.0 to 44.9 in adult (HCC)    Other sleep apnea    Snoring    Skin lesion of neck     Past Medical History:   Diagnosis Date    Abscess of toe 1/20/2017    Ingrowing nail 7/28/2016     Past Surgical History:   Procedure Laterality Date    CIRCUMCISION      TYMPANOSTOMY TUBE PLACEMENT Bilateral      Social History     Substance and Sexual Activity   Alcohol Use Yes     Social History     Substance and Sexual Activity   Drug Use Not Currently     Social History     Tobacco Use   Smoking Status Never    Passive exposure: Current   Smokeless Tobacco Never     Family History   Problem Relation Age of Onset    Diabetes Mother     Hypertension Father     Anxiety disorder Father      Health Maintenance Due   Topic    HIV Screening     COVID-19 Vaccine (3 - 2024-25 season)      Meds/Allergies     No current outpatient medications on file.      Objective:    Vitals:   /68 (BP Location: Right arm, Patient Position: Sitting,  "Cuff Size: Large)   Pulse 102   Ht 6' 4\" (1.93 m)   Wt (!) 157 kg (346 lb 3.2 oz)   SpO2 98%   BMI 42.14 kg/m²   Body mass index is 42.14 kg/m².  Vitals:    12/16/24 1128   Weight: (!) 157 kg (346 lb 3.2 oz)       Physical Exam  Nursing note reviewed.   Constitutional:       Appearance: He is obese.   Cardiovascular:      Rate and Rhythm: Normal rate and regular rhythm.   Pulmonary:      Effort: Pulmonary effort is normal.      Breath sounds: Normal breath sounds.   Abdominal:      General: Abdomen is flat.      Palpations: Abdomen is soft.   Musculoskeletal:         General: No swelling or tenderness.      Cervical back: Neck supple.   Neurological:      General: No focal deficit present.      Mental Status: He is oriented to person, place, and time.     No results found for this or any previous visit (from the past 8 weeks).     Lab Review   No visits with results within 2 Month(s) from this visit.   Latest known visit with results is:   Orders Only on 11/15/2023   Component Date Value Ref Range Status    Specific Gravity 11/15/2023 1.026  1.005 - 1.030 Final    Ph 11/15/2023 6.0  5.0 - 7.5 Final    Color UA 11/15/2023 Yellow  Yellow Final    Urine Appearance 11/15/2023 Clear  Clear Final    Leukocyte Esterase 11/15/2023 Negative  Negative Final    Protein 11/15/2023 Negative  Negative/Trace Final    Glucose, 24 HR Urine 11/15/2023 Negative  Negative Final    Ketone, Urine 11/15/2023 Negative  Negative Final    Blood, Urine 11/15/2023 Negative  Negative Final    Bilirubin, Urine 11/15/2023 Negative  Negative Final    Urobilinogen Urine 11/15/2023 0.2  0.2 - 1.0 mg/dL Final    Nitrites Urine 11/15/2023 Negative  Negative Final    Microscopic Examination 11/15/2023 Comment   Final    Microscopic follows if indicated.    Microscopic Examination 11/15/2023 See below:   Final    Microscopic was indicated and was performed.    Urinalysis Reflex 11/15/2023 Comment   Final    This specimen will not reflex to a Urine " "Culture.    SL AMB WBC, URINE 11/15/2023 None seen  0 - 5 /hpf Final    RBC, Urine 11/15/2023 None seen  0 - 2 /hpf Final    Epithelial Cells (non renal) 11/15/2023 None seen  0 - 10 /hpf Final    Casts 11/15/2023 None seen  None seen /lpf Final    Bacteria, Urine 11/15/2023 None seen  None seen/Few Final         Mike Pate MD        \"This note has been constructed using a voice recognition system.Therefore there may be syntax, spelling, and/or grammatical errors. Please call if you have any questions. \"  "

## 2024-12-18 ENCOUNTER — TELEPHONE (OUTPATIENT)
Dept: BARIATRICS | Facility: CLINIC | Age: 19
End: 2024-12-18

## 2024-12-18 NOTE — TELEPHONE ENCOUNTER
Called and no/a Lmsg for the patient to call our office at their earliest convenience to r/s the appt. Due to provider out of the office.

## 2024-12-24 ENCOUNTER — TELEPHONE (OUTPATIENT)
Dept: PULMONOLOGY | Facility: MEDICAL CENTER | Age: 19
End: 2024-12-24

## 2024-12-24 NOTE — TELEPHONE ENCOUNTER
LM for patient to call office back to schedule follow up appointment to go over sleep study results. Patient may be scheduled with an AP.

## 2024-12-30 NOTE — TELEPHONE ENCOUNTER
LM for patient to call office back to schedule follow up appointment to discuss sleep study results.

## 2025-01-27 ENCOUNTER — OFFICE VISIT (OUTPATIENT)
Age: 20
End: 2025-01-27
Payer: COMMERCIAL

## 2025-01-27 VITALS — HEIGHT: 76 IN | TEMPERATURE: 99 F | BODY MASS INDEX: 38.36 KG/M2 | WEIGHT: 315 LBS

## 2025-01-27 DIAGNOSIS — L81.2 FRECKLE: ICD-10-CM

## 2025-01-27 DIAGNOSIS — D22.70 MULTIPLE BENIGN MELANOCYTIC NEVI OF UPPER EXTREMITY, LOWER EXTREMITY, AND TRUNK: ICD-10-CM

## 2025-01-27 DIAGNOSIS — L21.9 SEBORRHEIC DERMATITIS: Primary | ICD-10-CM

## 2025-01-27 DIAGNOSIS — D22.60 MULTIPLE BENIGN MELANOCYTIC NEVI OF UPPER EXTREMITY, LOWER EXTREMITY, AND TRUNK: ICD-10-CM

## 2025-01-27 DIAGNOSIS — D18.01 CHERRY ANGIOMA: ICD-10-CM

## 2025-01-27 DIAGNOSIS — D22.5 MULTIPLE BENIGN MELANOCYTIC NEVI OF UPPER EXTREMITY, LOWER EXTREMITY, AND TRUNK: ICD-10-CM

## 2025-01-27 DIAGNOSIS — L81.4 SOLAR LENTIGO: ICD-10-CM

## 2025-01-27 PROCEDURE — 99204 OFFICE O/P NEW MOD 45 MIN: CPT | Performed by: DERMATOLOGY

## 2025-01-27 RX ORDER — KETOCONAZOLE 20 MG/ML
SHAMPOO, SUSPENSION TOPICAL
Qty: 120 ML | Refills: 3 | Status: SHIPPED | OUTPATIENT
Start: 2025-01-27

## 2025-01-27 NOTE — PATIENT INSTRUCTIONS
"SEBORRHEIC DERMATITIS    Assessment and Plan:  Based on a thorough discussion of this condition and the management approach to it (including a comprehensive discussion of the known risks, side effects and potential benefits of treatment), the patient (family) agrees to implement the following specific plan:  Ketoconazole 2% shampoo apply topically directly onto the scalp every other day, leave in for 5 minutes then rinse out completely.      Seborrheic Dermatitis   Seborrheic dermatitis is a common, chronic or relapsing form of eczema/dermatitis that mainly affects the sebaceous, gland-rich regions of the scalp, face, and trunk.  There are infantile and adult forms of seborrhoeic dermatitis. It is sometimes associated with psoriasis and, in that clinical scenario, may be referred to as \"sebo-psoriasis.\"  Seborrheic dermatitis is also known as \"seborrheic eczema.\"  Dandruff (also called \"pityriasis capitis\") is an uninflamed form of seborrhoeic dermatitis. Dandruff presents as bran-like scaly patches scattered within hair-bearing areas of the scalp.  In an infant, this condition may be referred to as \"cradle cap.\"  The cause of seborrheic dermatitis is not completely understood. It is associated with proliferation of various species of the skin commensal Malassezia, in its yeast (non-pathogenic) form. Its metabolites (such as the fatty acids oleic acid, malssezin, and indole-3-carbaldehyde) may cause an inflammatory reaction. Differences in skin barrier lipid content and function may account for individual presentations.    MELANOCYTIC NEVI  -Relevant exam: Scattered over the trunk/extremities are homogenously pigmented brown macules and papules. ELM performed and without concerning findings.  - Exam and clinical history consistent with melanocytic nevi  - Educated on the ABCDE's of melanoma; handout provided  - Counseled to return to clinic prior to scheduled appointment should any of these lesions change or should " any new lesions of concern arise  - Counseled on use of sun protection daily. Reviewed latest FDA sunscreen guidelines, including use of broad spectrum (UVA and UVB blocking) sunscreen or sun protective clothing with SPF 30-50 every 2-3 hours and reapplied after exposure to water; use of photoprotective clothing, including a broad brim hat and UPF rated clothing if outdoors for several hours; avoid use of tanning beds as these pose significant risk for melanoma and skin cancer.    LENTIGINES  OTHER SKIN CHANGES DUE TO CHRONIC EXPOSURE TO NONIONIZING RADIATION  - Relevant exam: Over sun exposed areas are brown macules. ELM performed and without concerning findings.  - Exam and clinical history consistent with lentigines.  - Educated that these are indicative of prior sun exposure.   - Counseled to return to clinic prior to scheduled appointment should any of these lesions change or should any new lesions of concern arise.  - Recommended use of sunscreen as above and below.  - Counseled on use of sun protection daily. Reviewed latest FDA sunscreen guidelines, including use of broad spectrum (UVA and UVB blocking) sunscreen or sun protective clothing with SPF 30-50 every 2-3 hours and reapplied after exposure to water; use of photoprotective clothing, including a broad brim hat and UPF rated clothing if outdoors for several hours; avoid use of tanning beds as these pose significant risk for melanoma and skin cancer.    CHERRY ANGIOMAS  - Relevant exam: Scattered over the trunk/extremities are red papules  - Exam and clinical history consistent with cherry angiomas  - Educated that these are benign  - Educated that removal is considered aesthetic and would incur a fee.  - Patient does not wish to pursue removal at this time but will contact us should this change.

## 2025-01-27 NOTE — PROGRESS NOTES
"[facility] Dermatology Clinic Note     Patient Name: Toribio Mack  Encounter Date: 1/27/25     Have you been cared for by a [facility] Dermatologist in the last 3 years and, if so, which description applies to you?    NO.   I am considered a \"new\" patient and must complete all patient intake questions. I am MALE/not capable of bearing children.    REVIEW OF SYSTEMS:  Have you recently had or currently have any of the following? Recent fever or chills? No  Any non-healing wound? No   PAST MEDICAL HISTORY:  Have you personally ever had or currently have any of the following?  If \"YES,\" then please provide more detail. Skin cancer (such as Melanoma, Basal Cell Carcinoma, Squamous Cell Carcinoma?  No  Tuberculosis, HIV/AIDS, Hepatitis B or C: No  Radiation Treatment No   HISTORY OF IMMUNOSUPPRESSION:   Do you have a history of any of the following:  Systemic Immunosuppression such as Diabetes, Biologic or Immunotherapy, Chemotherapy, Organ Transplantation, Bone Marrow Transplantation or Prednisone?  No     Answering \"YES\" requires the addition of the dotphrase \"IMMUNOSUPPRESSED\" as the first diagnosis of the patient's visit.   FAMILY HISTORY:  Any \"first degree relatives\" (parent, brother, sister, or child) with the following?    Skin Cancer, Pancreatic or Other Cancer? No   PATIENT EXPERIENCE:    Do you want the Dermatologist to perform a COMPLETE skin exam today including a clinical examination under the \"bra and underwear\" areas?  No  If necessary, do we have your permission to call and leave a detailed message on your Preferred Phone number that includes your specific medical information?  Yes      No Known Allergies   Current Outpatient Medications:   •  ketoconazole (NIZORAL) 2 % shampoo, Apply topically directly onto the scalp every other day, leave in for 5 minutes then rinse out completely., Disp: 120 mL, Rfl: 3          Whom besides the patient is providing clinical information about today's encounter? " "  NO ADDITIONAL HISTORIAN (patient alone provided history)    Physical Exam and Assessment/Plan by Diagnosis:    SEBORRHEIC DERMATITIS    Physical Exam:  Anatomic Location Affected:  scalp  Morphological Description:  fine scale minimal erythema  Pertinent Positives:  Pertinent Negatives:    Additional History of Present Condition:  patient denies itching    Assessment and Plan:  Based on a thorough discussion of this condition and the management approach to it (including a comprehensive discussion of the known risks, side effects and potential benefits of treatment), the patient (family) agrees to implement the following specific plan:  Ketoconazole 2% shampoo apply topically directly onto the scalp every other day, leave in for 5 minutes then rinse out completely.      Seborrheic Dermatitis   Seborrheic dermatitis is a common, chronic or relapsing form of eczema/dermatitis that mainly affects the sebaceous, gland-rich regions of the scalp, face, and trunk.  There are infantile and adult forms of seborrhoeic dermatitis. It is sometimes associated with psoriasis and, in that clinical scenario, may be referred to as \"sebo-psoriasis.\"  Seborrheic dermatitis is also known as \"seborrheic eczema.\"  Dandruff (also called \"pityriasis capitis\") is an uninflamed form of seborrhoeic dermatitis. Dandruff presents as bran-like scaly patches scattered within hair-bearing areas of the scalp.  In an infant, this condition may be referred to as \"cradle cap.\"  The cause of seborrheic dermatitis is not completely understood. It is associated with proliferation of various species of the skin commensal Malassezia, in its yeast (non-pathogenic) form. Its metabolites (such as the fatty acids oleic acid, malssezin, and indole-3-carbaldehyde) may cause an inflammatory reaction. Differences in skin barrier lipid content and function may account for individual presentations.    MELANOCYTIC NEVI  -Relevant exam: Scattered over the " trunk/extremities are homogenously pigmented brown macules and papules. ELM performed and without concerning findings.  - Exam and clinical history consistent with melanocytic nevi  - Educated on the ABCDE's of melanoma; handout provided  - Counseled to return to clinic prior to scheduled appointment should any of these lesions change or should any new lesions of concern arise  - Counseled on use of sun protection daily. Reviewed latest FDA sunscreen guidelines, including use of broad spectrum (UVA and UVB blocking) sunscreen or sun protective clothing with SPF 30-50 every 2-3 hours and reapplied after exposure to water; use of photoprotective clothing, including a broad brim hat and UPF rated clothing if outdoors for several hours; avoid use of tanning beds as these pose significant risk for melanoma and skin cancer.    Scribe Attestation    I,:  Sakshi Muse MA am acting as a scribe while in the presence of the attending physician.:       I,:  Nadeen Sellers MD personally performed the services described in this documentation    as scribed in my presence.:            LENTIGINES  OTHER SKIN CHANGES DUE TO CHRONIC EXPOSURE TO NONIONIZING RADIATION  - Relevant exam: Over sun exposed areas are brown macules. ELM performed and without concerning findings.  - Exam and clinical history consistent with lentigines.  - Educated that these are indicative of prior sun exposure.   - Counseled to return to clinic prior to scheduled appointment should any of these lesions change or should any new lesions of concern arise.  - Recommended use of sunscreen as above and below.  - Counseled on use of sun protection daily. Reviewed latest FDA sunscreen guidelines, including use of broad spectrum (UVA and UVB blocking) sunscreen or sun protective clothing with SPF 30-50 every 2-3 hours and reapplied after exposure to water; use of photoprotective clothing, including a broad brim hat and UPF rated clothing if outdoors for  several hours; avoid use of tanning beds as these pose significant risk for melanoma and skin cancer.    CHERRY ANGIOMAS  - Relevant exam: Scattered over the trunk/extremities are red papules  - Exam and clinical history consistent with cherry angiomas  - Educated that these are benign  - Educated that removal is considered aesthetic and would incur a fee.  - Patient does not wish to pursue removal at this time but will contact us should this change.    NOTE:  patient is concerned about a luly in the genital area but refuses clinical evaluation.  Discussed seeing one of the male doctors in the practice.  Patient states he will have his family doctor take a picture and send it to us.

## 2025-05-05 ENCOUNTER — OFFICE VISIT (OUTPATIENT)
Dept: FAMILY MEDICINE CLINIC | Facility: CLINIC | Age: 20
End: 2025-05-05
Payer: COMMERCIAL

## 2025-05-05 VITALS
SYSTOLIC BLOOD PRESSURE: 126 MMHG | HEIGHT: 76 IN | DIASTOLIC BLOOD PRESSURE: 75 MMHG | BODY MASS INDEX: 38.36 KG/M2 | HEART RATE: 90 BPM | OXYGEN SATURATION: 99 % | WEIGHT: 315 LBS

## 2025-05-05 DIAGNOSIS — Z00.00 ANNUAL PHYSICAL EXAM: ICD-10-CM

## 2025-05-05 DIAGNOSIS — G47.39 OTHER SLEEP APNEA: ICD-10-CM

## 2025-05-05 DIAGNOSIS — E66.813 CLASS 3 SEVERE OBESITY DUE TO EXCESS CALORIES WITHOUT SERIOUS COMORBIDITY WITH BODY MASS INDEX (BMI) OF 40.0 TO 44.9 IN ADULT: Primary | ICD-10-CM

## 2025-05-05 DIAGNOSIS — L98.9 SKIN LESION OF NECK: ICD-10-CM

## 2025-05-05 PROCEDURE — 99395 PREV VISIT EST AGE 18-39: CPT | Performed by: INTERNAL MEDICINE

## 2025-05-05 PROCEDURE — 99213 OFFICE O/P EST LOW 20 MIN: CPT | Performed by: INTERNAL MEDICINE

## 2025-05-05 NOTE — ASSESSMENT & PLAN NOTE
Patient with a BMI of 41.26 lost about 6 to 7 pounds in last few months time patient was wondering about semaglutide injections.  He was seen in the past by the weight management clinic couple of times but he lost the follow-ups with them.  Recommend very strongly to make another appointment with them and based on the recommendation will make further decisions and follow-up.  Orders:  •  Ambulatory referral to Weight Management; Future

## 2025-05-05 NOTE — ASSESSMENT & PLAN NOTE
Home study was negative patient denies any symptoms of tired feeling denies any snoring symptoms at present time we will continue to monitor

## 2025-05-05 NOTE — ASSESSMENT & PLAN NOTE
Patient seen by the dermatologist regarding small skin lesions on the neck unremarkable patient also with seborrheic dermatitis ordered ketoconazole shampoo done with the treatment stable

## 2025-05-05 NOTE — PROGRESS NOTES
Adult Annual Physical  Name: Toribio Mack      : 2005      MRN: [id number]  Encounter Provider: Mike Pate MD  Encounter Date: 2025   Encounter department: [facility] [place]    :  Assessment & Plan  Class 3 severe obesity due to excess calories without serious comorbidity with body mass index (BMI) of 40.0 to 44.9 in adult      Patient with a BMI of 41.26 lost about 6 to 7 pounds in last few months time patient was wondering about semaglutide injections.  He was seen in the past by the weight management clinic couple of times but he lost the follow-ups with them.  Recommend very strongly to make another appointment with them and based on the recommendation will make further decisions and follow-up.  Orders:  •  Ambulatory referral to Weight Management; Future    Other sleep apnea  Home study was negative patient denies any symptoms of tired feeling denies any snoring symptoms at present time we will continue to monitor       Skin lesion of neck  Patient seen by the dermatologist regarding small skin lesions on the neck unremarkable patient also with seborrheic dermatitis ordered ketoconazole shampoo done with the treatment stable       Annual physical exam             Preventive Screenings:    - Hepatitis C screening: patient agrees to screening   - HIV screening: patient agrees to screening   - Colon cancer screening: screening not indicated   - Lung cancer screening: screening not indicated   - Prostate cancer screening: screening not indicated     Immunizations:  - Immunizations due: HPV (Gardasil 9)    Counseling/Anticipatory Guidance:  - Alcohol: discussed moderation in alcohol intake and recommendations for healthy alcohol use.   - Drug use: discussed harms of illicit drug use and how it can negatively impact mental/physical health.   - Tobacco use: discussed harms of tobacco use and management options for quitting.   - Dental health: discussed importance of regular  tooth brushing, flossing, and dental visits.   - Sexual health: discussed sexually transmitted diseases, partner selection, use of condoms, avoidance of unintended pregnancy, and contraceptive alternatives.   - Diet: discussed recommendations for a healthy/well-balanced diet.   - Exercise: the importance of regular exercise/physical activity was discussed. Recommend exercise 3-5 times per week for at least 30 minutes.   - Injury prevention: discussed safety/seat belts, safety helmets, smoke detectors, carbon monoxide detectors, and smoking near bedding or upholstery.       Depression Screening and Follow-up Plan: Patient was screened for depression during today's encounter. They screened negative with a PHQ-2 score of 0.          History of Present Illness patient is coming for the follow-up evaluation with regards to the symptoms of obesity.  He was in the past seen by the weight management but he did not go in last few months time recommend very strongly to go for the follow-ups consider about starting the patient on semaglutide injection after seeing with a weight management    Adult Annual Physical:  Patient presents for annual physical.     Diet and Physical Activity:  - Diet/Nutrition: no special diet.  - Exercise: walking, 1-2 times a week on average and less than 30 minutes on average.    Depression Screening:  - PHQ-2 Score: 0  - PHQ-9 Score: 0    General Health:  - Sleep: sleeps well and 7-8 hours of sleep on average.  - Hearing: normal hearing bilateral ears.  - Vision: vision problems, wears glasses and most recent eye exam < 1 year ago.  - Dental: no dental visits for > 1 year, brushes teeth twice daily and does not floss.     Health:  - History of STDs: no.   - Urinary symptoms: none.     Advanced Care Planning:  - Has an advanced directive?: no    - Has a durable medical POA?: no    - ACP document given to patient?: yes      Review of Systems   Constitutional:  Negative for chills and fever.   HENT:   Negative for ear pain and sore throat.    Eyes:  Negative for pain and visual disturbance.   Respiratory:  Negative for cough and shortness of breath.    Cardiovascular:  Negative for chest pain and palpitations.   Gastrointestinal:  Negative for abdominal pain and vomiting.   Genitourinary:  Negative for dysuria and hematuria.   Musculoskeletal:  Negative for arthralgias and back pain.   Skin:  Negative for color change and rash.   Neurological:  Negative for seizures and syncope.   All other systems reviewed and are negative.    Pertinent Medical History   As in the history of present illness        Medical History Reviewed by provider this encounter:  Tobacco  Allergies  Meds  Problems  Med Hx  Surg Hx  Fam Hx     .  Past Medical History   Past Medical History:   Diagnosis Date   • Abscess of toe 1/20/2017   • Ingrowing nail 7/28/2016     Past Surgical History:   Procedure Laterality Date   • CIRCUMCISION     • TYMPANOSTOMY TUBE PLACEMENT Bilateral      Family History   Problem Relation Age of Onset   • Diabetes Mother    • Hypertension Father    • Anxiety disorder Father       reports that he has never smoked. He has been exposed to tobacco smoke. He has never used smokeless tobacco. He reports current alcohol use. He reports that he does not currently use drugs.  No current outpatient medicationsNo Known Allergies   Current Outpatient Medications on File Prior to Visit   Medication Sig Dispense Refill   • [DISCONTINUED] ketoconazole (NIZORAL) 2 % shampoo Apply topically directly onto the scalp every other day, leave in for 5 minutes then rinse out completely. (Patient not taking: Reported on 5/5/2025) 120 mL 3     No current facility-administered medications on file prior to visit.      Social History     Tobacco Use   • Smoking status: Never     Passive exposure: Current   • Smokeless tobacco: Never   Vaping Use   • Vaping status: Former   • Substances: Nicotine   Substance and Sexual Activity   •  "Alcohol use: Yes   • Drug use: Not Currently   • Sexual activity: Not Currently       Objective   /75 (BP Location: Right arm, Patient Position: Sitting, Cuff Size: Large)   Pulse 90   Ht 6' 4\" (1.93 m)   Wt (!) 154 kg (339 lb)   SpO2 99%   BMI 41.26 kg/m²     Physical Exam  Vitals and nursing note reviewed.   Constitutional:       General: He is not in acute distress.     Appearance: He is well-developed.   HENT:      Head: Normocephalic and atraumatic.   Eyes:      Conjunctiva/sclera: Conjunctivae normal.   Cardiovascular:      Rate and Rhythm: Normal rate and regular rhythm.      Heart sounds: No murmur heard.  Pulmonary:      Effort: Pulmonary effort is normal. No respiratory distress.      Breath sounds: Normal breath sounds.   Abdominal:      Palpations: Abdomen is soft.      Tenderness: There is no abdominal tenderness.   Musculoskeletal:         General: No swelling.      Cervical back: Neck supple.   Skin:     General: Skin is warm and dry.      Capillary Refill: Capillary refill takes less than 2 seconds.   Neurological:      Mental Status: He is alert.   Psychiatric:         Mood and Affect: Mood normal.         "